# Patient Record
Sex: MALE | Race: WHITE | Employment: UNEMPLOYED | URBAN - NONMETROPOLITAN AREA
[De-identification: names, ages, dates, MRNs, and addresses within clinical notes are randomized per-mention and may not be internally consistent; named-entity substitution may affect disease eponyms.]

---

## 2019-09-24 ENCOUNTER — HOSPITAL ENCOUNTER (INPATIENT)
Age: 29
LOS: 3 days | Discharge: HOME | DRG: 751 | End: 2019-09-27
Attending: PSYCHIATRY & NEUROLOGY | Admitting: PSYCHIATRY & NEUROLOGY
Payer: COMMERCIAL

## 2019-09-24 DIAGNOSIS — R45.851 PASSIVE SUICIDAL IDEATIONS: ICD-10-CM

## 2019-09-24 DIAGNOSIS — F32.2 CURRENT SEVERE EPISODE OF MAJOR DEPRESSIVE DISORDER WITHOUT PSYCHOTIC FEATURES, UNSPECIFIED WHETHER RECURRENT (HCC): Primary | ICD-10-CM

## 2019-09-24 DIAGNOSIS — F19.10 SUBSTANCE ABUSE (HCC): ICD-10-CM

## 2019-09-24 PROBLEM — F33.1 MODERATE EPISODE OF RECURRENT MAJOR DEPRESSIVE DISORDER (HCC): Status: ACTIVE | Noted: 2019-09-24

## 2019-09-24 LAB
ACETAMINOPHEN LEVEL: <15 UG/ML
ALBUMIN SERPL-MCNC: 4.9 G/DL (ref 3.5–5.2)
ALP BLD-CCNC: 92 U/L (ref 40–130)
ALT SERPL-CCNC: 104 U/L (ref 5–41)
AMPHETAMINE SCREEN, URINE: POSITIVE
ANION GAP SERPL CALCULATED.3IONS-SCNC: 12 MMOL/L (ref 7–19)
AST SERPL-CCNC: 64 U/L (ref 5–40)
BARBITURATE SCREEN URINE: NEGATIVE
BASOPHILS ABSOLUTE: 0 K/UL (ref 0–0.2)
BASOPHILS RELATIVE PERCENT: 0.4 % (ref 0–1)
BENZODIAZEPINE SCREEN, URINE: POSITIVE
BILIRUB SERPL-MCNC: 0.5 MG/DL (ref 0.2–1.2)
BUN BLDV-MCNC: 14 MG/DL (ref 6–20)
CALCIUM SERPL-MCNC: 9.9 MG/DL (ref 8.6–10)
CANNABINOID SCREEN URINE: NEGATIVE
CHLORIDE BLD-SCNC: 104 MMOL/L (ref 98–111)
CO2: 23 MMOL/L (ref 22–29)
COCAINE METABOLITE SCREEN URINE: NEGATIVE
CREAT SERPL-MCNC: 0.9 MG/DL (ref 0.5–1.2)
EOSINOPHILS ABSOLUTE: 0.1 K/UL (ref 0–0.6)
EOSINOPHILS RELATIVE PERCENT: 1.1 % (ref 0–5)
ETHANOL: <10 MG/DL (ref 0–0.08)
GFR NON-AFRICAN AMERICAN: >60
GLUCOSE BLD-MCNC: 157 MG/DL (ref 74–109)
HCT VFR BLD CALC: 51.3 % (ref 42–52)
HEMOGLOBIN: 16.7 G/DL (ref 14–18)
IMMATURE GRANULOCYTES #: 0 K/UL
INR BLD: 1.13 (ref 0.88–1.18)
LYMPHOCYTES ABSOLUTE: 1.8 K/UL (ref 1.1–4.5)
LYMPHOCYTES RELATIVE PERCENT: 24 % (ref 20–40)
Lab: ABNORMAL
MCH RBC QN AUTO: 27.6 PG (ref 27–31)
MCHC RBC AUTO-ENTMCNC: 32.6 G/DL (ref 33–37)
MCV RBC AUTO: 84.9 FL (ref 80–94)
MONOCYTES ABSOLUTE: 0.3 K/UL (ref 0–0.9)
MONOCYTES RELATIVE PERCENT: 4 % (ref 0–10)
NEUTROPHILS ABSOLUTE: 5.3 K/UL (ref 1.5–7.5)
NEUTROPHILS RELATIVE PERCENT: 70.2 % (ref 50–65)
OPIATE SCREEN URINE: NEGATIVE
PDW BLD-RTO: 12.5 % (ref 11.5–14.5)
PLATELET # BLD: 275 K/UL (ref 130–400)
PMV BLD AUTO: 10.6 FL (ref 9.4–12.4)
POTASSIUM SERPL-SCNC: 3.8 MMOL/L (ref 3.5–5)
PROTHROMBIN TIME: 13.9 SEC (ref 12–14.6)
RBC # BLD: 6.04 M/UL (ref 4.7–6.1)
SALICYLATE, SERUM: <3 MG/DL (ref 3–10)
SODIUM BLD-SCNC: 139 MMOL/L (ref 136–145)
TOTAL PROTEIN: 8.1 G/DL (ref 6.6–8.7)
WBC # BLD: 7.5 K/UL (ref 4.8–10.8)

## 2019-09-24 PROCEDURE — 36415 COLL VENOUS BLD VENIPUNCTURE: CPT

## 2019-09-24 PROCEDURE — 1240000000 HC EMOTIONAL WELLNESS R&B

## 2019-09-24 PROCEDURE — 85025 COMPLETE CBC W/AUTO DIFF WBC: CPT

## 2019-09-24 PROCEDURE — 85610 PROTHROMBIN TIME: CPT

## 2019-09-24 PROCEDURE — 80307 DRUG TEST PRSMV CHEM ANLYZR: CPT

## 2019-09-24 PROCEDURE — 80053 COMPREHEN METABOLIC PANEL: CPT

## 2019-09-24 PROCEDURE — G0480 DRUG TEST DEF 1-7 CLASSES: HCPCS

## 2019-09-24 PROCEDURE — 99285 EMERGENCY DEPT VISIT HI MDM: CPT

## 2019-09-24 PROCEDURE — 90792 PSYCH DIAG EVAL W/MED SRVCS: CPT | Performed by: NURSE PRACTITIONER

## 2019-09-24 PROCEDURE — 6370000000 HC RX 637 (ALT 250 FOR IP): Performed by: NURSE PRACTITIONER

## 2019-09-24 RX ORDER — LANOLIN ALCOHOL/MO/W.PET/CERES
3 CREAM (GRAM) TOPICAL NIGHTLY PRN
Status: DISCONTINUED | OUTPATIENT
Start: 2019-09-24 | End: 2019-09-27 | Stop reason: HOSPADM

## 2019-09-24 RX ORDER — ESCITALOPRAM OXALATE 10 MG/1
10 TABLET ORAL DAILY
Status: DISCONTINUED | OUTPATIENT
Start: 2019-09-24 | End: 2019-09-24

## 2019-09-24 RX ORDER — BUPROPION HYDROCHLORIDE 150 MG/1
150 TABLET ORAL DAILY
Status: DISCONTINUED | OUTPATIENT
Start: 2019-09-25 | End: 2019-09-27 | Stop reason: HOSPADM

## 2019-09-24 RX ORDER — NICOTINE 21 MG/24HR
1 PATCH, TRANSDERMAL 24 HOURS TRANSDERMAL DAILY
Status: DISCONTINUED | OUTPATIENT
Start: 2019-09-24 | End: 2019-09-27 | Stop reason: HOSPADM

## 2019-09-24 RX ADMIN — ESCITALOPRAM OXALATE 10 MG: 10 TABLET ORAL at 13:19

## 2019-09-24 ASSESSMENT — ENCOUNTER SYMPTOMS: RESPIRATORY NEGATIVE: 1

## 2019-09-24 ASSESSMENT — PATIENT HEALTH QUESTIONNAIRE - PHQ9: SUM OF ALL RESPONSES TO PHQ QUESTIONS 1-9: 25

## 2019-09-24 ASSESSMENT — PAIN SCALES - GENERAL: PAINLEVEL_OUTOF10: 0

## 2019-09-24 ASSESSMENT — SLEEP AND FATIGUE QUESTIONNAIRES
AVERAGE NUMBER OF SLEEP HOURS: 15
DO YOU HAVE DIFFICULTY SLEEPING: NO
DO YOU USE A SLEEP AID: NO

## 2019-09-24 ASSESSMENT — LIFESTYLE VARIABLES: HISTORY_ALCOHOL_USE: NO

## 2019-09-25 LAB
CHOLESTEROL, TOTAL: 193 MG/DL (ref 160–199)
HBA1C MFR BLD: 5.5 % (ref 4–6)
HDLC SERPL-MCNC: 34 MG/DL (ref 55–121)
LDL CHOLESTEROL CALCULATED: 130 MG/DL
TRIGL SERPL-MCNC: 145 MG/DL (ref 0–149)
TSH REFLEX FT4: 2.31 UIU/ML (ref 0.35–5.5)
VITAMIN B-12: 1203 PG/ML (ref 211–946)
VITAMIN D 25-HYDROXY: 33.8 NG/ML

## 2019-09-25 PROCEDURE — 83036 HEMOGLOBIN GLYCOSYLATED A1C: CPT

## 2019-09-25 PROCEDURE — 84443 ASSAY THYROID STIM HORMONE: CPT

## 2019-09-25 PROCEDURE — 82607 VITAMIN B-12: CPT

## 2019-09-25 PROCEDURE — 80061 LIPID PANEL: CPT

## 2019-09-25 PROCEDURE — 6370000000 HC RX 637 (ALT 250 FOR IP): Performed by: NURSE PRACTITIONER

## 2019-09-25 PROCEDURE — 82306 VITAMIN D 25 HYDROXY: CPT

## 2019-09-25 PROCEDURE — 1240000000 HC EMOTIONAL WELLNESS R&B

## 2019-09-25 PROCEDURE — 36415 COLL VENOUS BLD VENIPUNCTURE: CPT

## 2019-09-25 PROCEDURE — 99231 SBSQ HOSP IP/OBS SF/LOW 25: CPT | Performed by: NURSE PRACTITIONER

## 2019-09-25 RX ADMIN — MELATONIN 3 MG: at 20:37

## 2019-09-25 RX ADMIN — BUPROPION HYDROCHLORIDE 150 MG: 150 TABLET, FILM COATED, EXTENDED RELEASE ORAL at 07:54

## 2019-09-26 PROCEDURE — 1240000000 HC EMOTIONAL WELLNESS R&B

## 2019-09-26 PROCEDURE — 99231 SBSQ HOSP IP/OBS SF/LOW 25: CPT | Performed by: NURSE PRACTITIONER

## 2019-09-26 PROCEDURE — 6370000000 HC RX 637 (ALT 250 FOR IP): Performed by: NURSE PRACTITIONER

## 2019-09-26 RX ADMIN — BUPROPION HYDROCHLORIDE 150 MG: 150 TABLET, FILM COATED, EXTENDED RELEASE ORAL at 08:14

## 2019-09-26 RX ADMIN — MELATONIN 3 MG: at 21:21

## 2019-09-26 ASSESSMENT — PAIN SCALES - GENERAL
PAINLEVEL_OUTOF10: 0
PAINLEVEL_OUTOF10: 0

## 2019-09-27 VITALS
HEIGHT: 70 IN | TEMPERATURE: 98.1 F | RESPIRATION RATE: 20 BRPM | DIASTOLIC BLOOD PRESSURE: 81 MMHG | WEIGHT: 260.8 LBS | SYSTOLIC BLOOD PRESSURE: 137 MMHG | OXYGEN SATURATION: 97 % | BODY MASS INDEX: 37.34 KG/M2 | HEART RATE: 82 BPM

## 2019-09-27 PROBLEM — F33.1 MAJOR DEPRESSIVE DISORDER, RECURRENT, MODERATE (HCC): Status: ACTIVE | Noted: 2019-09-27

## 2019-09-27 PROCEDURE — 6370000000 HC RX 637 (ALT 250 FOR IP): Performed by: NURSE PRACTITIONER

## 2019-09-27 PROCEDURE — 99238 HOSP IP/OBS DSCHRG MGMT 30/<: CPT | Performed by: NURSE PRACTITIONER

## 2019-09-27 PROCEDURE — 5130000000 HC BRIDGE APPOINTMENT

## 2019-09-27 RX ORDER — BUPROPION HYDROCHLORIDE 150 MG/1
150 TABLET ORAL DAILY
Qty: 30 TABLET | Refills: 0 | Status: SHIPPED | OUTPATIENT
Start: 2019-09-28

## 2019-09-27 RX ORDER — LANOLIN ALCOHOL/MO/W.PET/CERES
6 CREAM (GRAM) TOPICAL NIGHTLY PRN
Qty: 60 TABLET | Refills: 0 | Status: SHIPPED | OUTPATIENT
Start: 2019-09-27

## 2019-09-27 RX ADMIN — BUPROPION HYDROCHLORIDE 150 MG: 150 TABLET, FILM COATED, EXTENDED RELEASE ORAL at 08:57

## 2021-01-04 ENCOUNTER — APPOINTMENT (OUTPATIENT)
Dept: GENERAL RADIOLOGY | Facility: HOSPITAL | Age: 31
End: 2021-01-04

## 2021-01-04 ENCOUNTER — HOSPITAL ENCOUNTER (EMERGENCY)
Facility: HOSPITAL | Age: 31
Discharge: HOME | End: 2021-01-04
Admitting: INTERNAL MEDICINE

## 2021-01-04 VITALS
HEIGHT: 69 IN | HEART RATE: 69 BPM | DIASTOLIC BLOOD PRESSURE: 90 MMHG | TEMPERATURE: 97.4 F | RESPIRATION RATE: 15 BRPM | WEIGHT: 274 LBS | BODY MASS INDEX: 40.58 KG/M2 | OXYGEN SATURATION: 97 % | SYSTOLIC BLOOD PRESSURE: 152 MMHG

## 2021-01-04 DIAGNOSIS — R07.89 RIGHT-SIDED CHEST WALL PAIN: Primary | ICD-10-CM

## 2021-01-04 PROCEDURE — 71101 X-RAY EXAM UNILAT RIBS/CHEST: CPT

## 2021-01-04 PROCEDURE — 99282 EMERGENCY DEPT VISIT SF MDM: CPT

## 2021-01-04 RX ORDER — NAPROXEN 500 MG/1
500 TABLET ORAL 2 TIMES DAILY WITH MEALS
Qty: 20 TABLET | Refills: 0 | Status: SHIPPED | OUTPATIENT
Start: 2021-01-04 | End: 2021-01-14

## 2021-01-04 NOTE — DISCHARGE INSTRUCTIONS
Drink plenty of fluid.  Medication as ordered.  Can add tylenol as needed.  Follow up with PCP - call for appointment. Return to ED if condition does not improve or worsens

## 2021-01-04 NOTE — ED PROVIDER NOTES
Subjective   30 yom presents with c/o right sided rib pain.  He states he noticed the pain at 1600 yesterday.  The pain is located in the lower anterior rib area. The pain is worse with a deep breath.  There is no tenderness to the chest wall.  There is no swelling or deformity to the chest wall.  He denies injury.  He denies shortness of breath or chest pain.  He denies fever or cough.  He states he has been watching his daughter more and they've been playing and wrestling.          Review of Systems   Constitutional: Negative for activity change, appetite change, fatigue and fever.   HENT: Negative for congestion, ear pain, facial swelling and sore throat.    Eyes: Negative for discharge and visual disturbance.   Respiratory: Negative for apnea, chest tightness, shortness of breath, wheezing and stridor.    Cardiovascular: Negative for chest pain and palpitations.   Gastrointestinal: Negative for abdominal distention, abdominal pain, diarrhea, nausea and vomiting.   Genitourinary: Negative for difficulty urinating and dysuria.   Musculoskeletal: Negative for arthralgias and myalgias.   Skin: Negative for rash and wound.   Neurological: Negative for dizziness and seizures.   Psychiatric/Behavioral: Negative for agitation and confusion.       History reviewed. No pertinent past medical history.    No Known Allergies    Past Surgical History:   Procedure Laterality Date   • APPENDECTOMY N/A 9/19/2016    Procedure: APPENDECTOMY LAPAROSCOPIC;  Surgeon: Lauren Elizalde MD;  Location: [facility];  Service:        History reviewed. No pertinent family history.    Social History     Socioeconomic History   • Marital status: Single     Spouse name: Not on file   • Number of children: Not on file   • Years of education: Not on file   • Highest education level: Not on file   Tobacco Use   • Smoking status: Former Smoker   • Smokeless tobacco: Current User     Types: Chew   Substance and Sexual Activity   • Alcohol use: No   •  "Drug use: Defer   • Sexual activity: Defer           Objective   Physical Exam  Vitals signs and nursing note reviewed.   Constitutional:       Appearance: He is well-developed.   HENT:      Head: Normocephalic.   Eyes:      Pupils: Pupils are equal, round, and reactive to light.   Neck:      Musculoskeletal: Normal range of motion and neck supple.   Cardiovascular:      Rate and Rhythm: Normal rate and regular rhythm.      Heart sounds: No murmur.   Pulmonary:      Effort: Pulmonary effort is normal.      Breath sounds: Normal breath sounds.   Chest:      Chest wall: No mass, lacerations, deformity, swelling, tenderness, crepitus or edema.   Abdominal:      General: Bowel sounds are normal.      Palpations: Abdomen is soft.   Musculoskeletal: Normal range of motion.   Skin:     General: Skin is warm and dry.   Neurological:      Mental Status: He is alert and oriented to person, place, and time.         Procedures          No current facility-administered medications for this encounter.     Current Outpatient Medications:   •  naproxen (NAPROSYN) 500 MG tablet, Take 1 tablet by mouth 2 (Two) Times a Day With Meals for 10 days., Disp: 20 tablet, Rfl: 0    Vital signs:  /90   Pulse 69   Temp 97.4 °F (36.3 °C)   Resp 15   Ht 175.3 cm (69\")   Wt 124 kg (274 lb)   SpO2 97%   BMI 40.46 kg/m²        ED LAB RESULTS:   Lab Results (last 24 hours)     ** No results found for the last 24 hours. **             IMAGING RESULTS  XR Ribs Right With PA Chest   ED Interpretation   See results below      Final Result   1. Pulmonary hypoventilation with no consolidating infiltrates.   2. No right-sided rib pathology is detected.   This report was finalized on 01/04/2021 09:43 by Dr. Vick Levi MD.                     ED Course  ED Course as of Jan 05 0957   Mon Jan 04, 2021   1035 I discussed testing results with the patient.  He voiced understanding of d'c instructions and testing results.    [KS]      ED Course " User Index  [KS] Gregorio Hall APRN                                           MDM  Number of Diagnoses or Management Options  Right-sided chest wall pain: minor     Amount and/or Complexity of Data Reviewed  Tests in the radiology section of CPT®: ordered and reviewed  Independent visualization of images, tracings, or specimens: yes    Risk of Complications, Morbidity, and/or Mortality  Presenting problems: minimal  Diagnostic procedures: minimal  Management options: minimal    Patient Progress  Patient progress: stable      Final diagnoses:   Right-sided chest wall pain            Gregorio Hall APRN  01/05/21 0957

## 2024-04-05 ENCOUNTER — HOSPITAL ENCOUNTER (EMERGENCY)
Age: 34
Discharge: HOME | End: 2024-04-05
Payer: COMMERCIAL

## 2024-04-05 ENCOUNTER — APPOINTMENT (OUTPATIENT)
Dept: GENERAL RADIOLOGY | Age: 34
End: 2024-04-05
Payer: COMMERCIAL

## 2024-04-05 VITALS
OXYGEN SATURATION: 100 % | TEMPERATURE: 98.2 F | DIASTOLIC BLOOD PRESSURE: 97 MMHG | HEART RATE: 76 BPM | RESPIRATION RATE: 18 BRPM | SYSTOLIC BLOOD PRESSURE: 154 MMHG

## 2024-04-05 DIAGNOSIS — F41.9 ANXIETY: ICD-10-CM

## 2024-04-05 DIAGNOSIS — R07.9 CHEST PAIN, UNSPECIFIED TYPE: Primary | ICD-10-CM

## 2024-04-05 LAB
ALBUMIN SERPL-MCNC: 5 G/DL (ref 3.5–5.2)
ALP SERPL-CCNC: 90 U/L (ref 40–130)
ALT SERPL-CCNC: 18 U/L (ref 5–41)
ANION GAP SERPL CALCULATED.3IONS-SCNC: 13 MMOL/L (ref 7–19)
AST SERPL-CCNC: 16 U/L (ref 5–40)
BASOPHILS # BLD: 0 K/UL (ref 0–0.2)
BASOPHILS NFR BLD: 0.3 % (ref 0–1)
BILIRUB SERPL-MCNC: 0.4 MG/DL (ref 0.2–1.2)
BUN SERPL-MCNC: 13 MG/DL (ref 6–20)
CALCIUM SERPL-MCNC: 9.7 MG/DL (ref 8.6–10)
CHLORIDE SERPL-SCNC: 102 MMOL/L (ref 98–111)
CO2 SERPL-SCNC: 24 MMOL/L (ref 22–29)
CREAT SERPL-MCNC: 0.8 MG/DL (ref 0.5–1.2)
EOSINOPHIL # BLD: 0 K/UL (ref 0–0.6)
EOSINOPHIL NFR BLD: 0.6 % (ref 0–5)
ERYTHROCYTE [DISTWIDTH] IN BLOOD BY AUTOMATED COUNT: 12 % (ref 11.5–14.5)
GLUCOSE SERPL-MCNC: 108 MG/DL (ref 74–109)
HCT VFR BLD AUTO: 48.1 % (ref 42–52)
HGB BLD-MCNC: 15.7 G/DL (ref 14–18)
IMM GRANULOCYTES # BLD: 0 K/UL
LYMPHOCYTES # BLD: 1.4 K/UL (ref 1.1–4.5)
LYMPHOCYTES NFR BLD: 22.2 % (ref 20–40)
MCH RBC QN AUTO: 28 PG (ref 27–31)
MCHC RBC AUTO-ENTMCNC: 32.6 G/DL (ref 33–37)
MCV RBC AUTO: 85.7 FL (ref 80–94)
MONOCYTES # BLD: 0.4 K/UL (ref 0–0.9)
MONOCYTES NFR BLD: 6.5 % (ref 0–10)
NEUTROPHILS # BLD: 4.6 K/UL (ref 1.5–7.5)
NEUTS SEG NFR BLD: 70.2 % (ref 50–65)
PLATELET # BLD AUTO: 326 K/UL (ref 130–400)
PMV BLD AUTO: 9.8 FL (ref 9.4–12.4)
POTASSIUM SERPL-SCNC: 4.1 MMOL/L (ref 3.5–5)
PROT SERPL-MCNC: 7.9 G/DL (ref 6.6–8.7)
RBC # BLD AUTO: 5.61 M/UL (ref 4.7–6.1)
SODIUM SERPL-SCNC: 139 MMOL/L (ref 136–145)
TROPONIN, HIGH SENSITIVITY: <6 NG/L (ref 0–22)
WBC # BLD AUTO: 6.5 K/UL (ref 4.8–10.8)

## 2024-04-05 PROCEDURE — 99285 EMERGENCY DEPT VISIT HI MDM: CPT

## 2024-04-05 PROCEDURE — 71045 X-RAY EXAM CHEST 1 VIEW: CPT

## 2024-04-05 PROCEDURE — 93005 ELECTROCARDIOGRAM TRACING: CPT | Performed by: NURSE PRACTITIONER

## 2024-04-05 PROCEDURE — 36415 COLL VENOUS BLD VENIPUNCTURE: CPT

## 2024-04-05 PROCEDURE — 85025 COMPLETE CBC W/AUTO DIFF WBC: CPT

## 2024-04-05 PROCEDURE — 84484 ASSAY OF TROPONIN QUANT: CPT

## 2024-04-05 PROCEDURE — 80053 COMPREHEN METABOLIC PANEL: CPT

## 2024-04-05 RX ORDER — HYDROXYZINE HYDROCHLORIDE 25 MG/1
25 TABLET, FILM COATED ORAL EVERY 8 HOURS PRN
Qty: 30 TABLET | Refills: 0 | Status: SHIPPED | OUTPATIENT
Start: 2024-04-05 | End: 2024-04-15

## 2024-04-05 ASSESSMENT — PAIN - FUNCTIONAL ASSESSMENT: PAIN_FUNCTIONAL_ASSESSMENT: NONE - DENIES PAIN

## 2024-04-05 ASSESSMENT — ENCOUNTER SYMPTOMS
BACK PAIN: 0
COUGH: 0
NAUSEA: 0
ABDOMINAL PAIN: 0
SHORTNESS OF BREATH: 0
DIARRHEA: 0
CHEST TIGHTNESS: 0

## 2024-04-05 ASSESSMENT — LIFESTYLE VARIABLES
HOW OFTEN DO YOU HAVE A DRINK CONTAINING ALCOHOL: NEVER
HOW MANY STANDARD DRINKS CONTAINING ALCOHOL DO YOU HAVE ON A TYPICAL DAY: PATIENT DOES NOT DRINK

## 2024-04-05 ASSESSMENT — HEART SCORE: ECG: NON-SPECIFC REPOLARIZATION DISTURBANCE/LBTB/PM

## 2024-04-05 NOTE — ED PROVIDER NOTES
[facility] EMERGENCY DEPT  EMERGENCY DEPARTMENT ENCOUNTER      Pt Name: Daljit Montero  MRN: [id number]  Birthdate 1990  Date of evaluation: 2024  Provider: RUDY Black CNP  11:03 AM    CHIEF COMPLAINT       Chief Complaint   Patient presents with    Anxiety     Pt presents to ED with c/o anxiety and hypertension after the passing of his dad last week denies SI denies HI. Pt calm and cooperative at this time. Pt states that he has drank kratom every day for the past year.      Hypertension         HISTORY OF PRESENT ILLNESS    Daljit Montero is a 33 y.o. male who presents to the emergency department with concern for anxiety and not feeling well since his dad  on . Denies SI/HI. Accompanied by mom and sister. Reports intermittent feeling of chest pain, rapid breathing, sweaty and anxious. Reports high blood pressure currently but no previous dx or script. No chest pain or shortness of breath currently. Feels jittery. No leg pain or swelling. PERC negative.     HPI    Nursing Notes were reviewed.    REVIEW OF SYSTEMS       Review of Systems   Constitutional:  Negative for chills and fever.   HENT:  Negative for congestion.    Respiratory:  Negative for cough, chest tightness and shortness of breath.    Cardiovascular:  Negative for chest pain, palpitations and leg swelling.   Gastrointestinal:  Negative for abdominal pain, diarrhea, nausea and vomiting.   Genitourinary:  Negative for dysuria, flank pain, frequency and urgency.   Musculoskeletal:  Negative for back pain and neck pain.   Neurological:  Negative for dizziness, syncope, weakness, light-headedness and headaches.   Psychiatric/Behavioral:  Negative for hallucinations, self-injury and suicidal ideas. The patient is nervous/anxious.        Except as noted above the remainder of the review of systems was reviewed and negative.       PAST MEDICAL HISTORY   No past medical history on file.      SURGICAL HISTORY       Past  mis-transcribed.)    RUDY Black CNP (electronically signed)  Attending Emergency Physician           Liz Freitas APRN - CNP  04/05/24 1103

## 2024-04-08 LAB
EKG P AXIS: 34 DEGREES
EKG P-R INTERVAL: 190 MS
EKG Q-T INTERVAL: 372 MS
EKG QRS DURATION: 110 MS
EKG QTC CALCULATION (BAZETT): 396 MS
EKG T AXIS: 44 DEGREES

## 2024-04-08 PROCEDURE — 93010 ELECTROCARDIOGRAM REPORT: CPT | Performed by: INTERNAL MEDICINE

## 2024-04-09 ENCOUNTER — TELEPHONE (OUTPATIENT)
Dept: PSYCHIATRY | Age: 34
End: 2024-04-09

## 2024-04-09 NOTE — TELEPHONE ENCOUNTER
Called pt to schedule an appt from a referral.    No answer and LVM.    Electronically signed by Elena Guzman MA on 4/9/2024 at 3:52 PM

## 2024-04-09 NOTE — TELEPHONE ENCOUNTER
Called pt to schedule an appt from a referral on 04/08/24    Offered an appt for 04/09/24 at 1:00 with Dr. Johnson and pt stated that he would give our office a call back to let us know if he would be able to come to that appt or not.    Electronically signed by Elena Guzman MA on 4/9/2024 at 3:51 PM

## 2024-04-10 ENCOUNTER — HOSPITAL ENCOUNTER (EMERGENCY)
Facility: HOSPITAL | Age: 34
Discharge: HOME | End: 2024-04-10
Payer: COMMERCIAL

## 2024-04-10 ENCOUNTER — APPOINTMENT (OUTPATIENT)
Dept: GENERAL RADIOLOGY | Facility: HOSPITAL | Age: 34
End: 2024-04-10
Payer: COMMERCIAL

## 2024-04-10 ENCOUNTER — APPOINTMENT (OUTPATIENT)
Dept: CT IMAGING | Facility: HOSPITAL | Age: 34
End: 2024-04-10
Payer: COMMERCIAL

## 2024-04-10 VITALS
HEART RATE: 64 BPM | WEIGHT: 228 LBS | SYSTOLIC BLOOD PRESSURE: 130 MMHG | TEMPERATURE: 98 F | DIASTOLIC BLOOD PRESSURE: 95 MMHG | BODY MASS INDEX: 34.56 KG/M2 | HEIGHT: 68 IN | RESPIRATION RATE: 16 BRPM | OXYGEN SATURATION: 97 %

## 2024-04-10 DIAGNOSIS — F41.9 ANXIETY: Primary | ICD-10-CM

## 2024-04-10 DIAGNOSIS — R11.0 NAUSEA: ICD-10-CM

## 2024-04-10 DIAGNOSIS — R10.9 ABDOMINAL PAIN, UNSPECIFIED ABDOMINAL LOCATION: ICD-10-CM

## 2024-04-10 DIAGNOSIS — F43.21 GRIEF REACTION: ICD-10-CM

## 2024-04-10 LAB
ALBUMIN SERPL-MCNC: 5.1 G/DL (ref 3.5–5.2)
ALBUMIN/GLOB SERPL: 1.7 G/DL
ALP SERPL-CCNC: 89 U/L (ref 39–117)
ALT SERPL W P-5'-P-CCNC: 14 U/L (ref 1–41)
AMPHET+METHAMPHET UR QL: NEGATIVE
AMPHETAMINES UR QL: NEGATIVE
ANION GAP SERPL CALCULATED.3IONS-SCNC: 13 MMOL/L (ref 5–15)
APTT PPP: 31 SECONDS (ref 24.5–36)
AST SERPL-CCNC: 15 U/L (ref 1–40)
B PARAPERT DNA SPEC QL NAA+PROBE: NOT DETECTED
B PERT DNA SPEC QL NAA+PROBE: NOT DETECTED
BARBITURATES UR QL SCN: NEGATIVE
BASOPHILS # BLD AUTO: 0.02 10*3/MM3 (ref 0–0.2)
BASOPHILS NFR BLD AUTO: 0.3 % (ref 0–1.5)
BENZODIAZ UR QL SCN: POSITIVE
BILIRUB SERPL-MCNC: 0.3 MG/DL (ref 0–1.2)
BUN SERPL-MCNC: 12 MG/DL (ref 6–20)
BUN/CREAT SERPL: 15.4 (ref 7–25)
BUPRENORPHINE SERPL-MCNC: NEGATIVE NG/ML
C PNEUM DNA NPH QL NAA+NON-PROBE: NOT DETECTED
CALCIUM SPEC-SCNC: 10.4 MG/DL (ref 8.6–10.5)
CANNABINOIDS SERPL QL: POSITIVE
CHLORIDE SERPL-SCNC: 103 MMOL/L (ref 98–107)
CO2 SERPL-SCNC: 23 MMOL/L (ref 22–29)
COCAINE UR QL: NEGATIVE
CREAT SERPL-MCNC: 0.78 MG/DL (ref 0.76–1.27)
DEPRECATED RDW RBC AUTO: 37 FL (ref 37–54)
EGFRCR SERPLBLD CKD-EPI 2021: 121.5 ML/MIN/1.73
EOSINOPHIL # BLD AUTO: 0.02 10*3/MM3 (ref 0–0.4)
EOSINOPHIL NFR BLD AUTO: 0.3 % (ref 0.3–6.2)
ERYTHROCYTE [DISTWIDTH] IN BLOOD BY AUTOMATED COUNT: 12.2 % (ref 12.3–15.4)
ETHANOL UR QL: <0.01 %
FENTANYL UR-MCNC: NEGATIVE NG/ML
FLUAV SUBTYP SPEC NAA+PROBE: NOT DETECTED
FLUBV RNA ISLT QL NAA+PROBE: NOT DETECTED
GEN 5 2HR TROPONIN T REFLEX: <6 NG/L
GLOBULIN UR ELPH-MCNC: 3 GM/DL
GLUCOSE SERPL-MCNC: 111 MG/DL (ref 65–99)
HADV DNA SPEC NAA+PROBE: NOT DETECTED
HCOV 229E RNA SPEC QL NAA+PROBE: NOT DETECTED
HCOV HKU1 RNA SPEC QL NAA+PROBE: NOT DETECTED
HCOV NL63 RNA SPEC QL NAA+PROBE: NOT DETECTED
HCOV OC43 RNA SPEC QL NAA+PROBE: NOT DETECTED
HCT VFR BLD AUTO: 47.5 % (ref 37.5–51)
HGB BLD-MCNC: 15.7 G/DL (ref 13–17.7)
HMPV RNA NPH QL NAA+NON-PROBE: NOT DETECTED
HPIV1 RNA ISLT QL NAA+PROBE: NOT DETECTED
HPIV2 RNA SPEC QL NAA+PROBE: NOT DETECTED
HPIV3 RNA NPH QL NAA+PROBE: NOT DETECTED
HPIV4 P GENE NPH QL NAA+PROBE: NOT DETECTED
IMM GRANULOCYTES # BLD AUTO: 0.02 10*3/MM3 (ref 0–0.05)
IMM GRANULOCYTES NFR BLD AUTO: 0.3 % (ref 0–0.5)
INR PPP: 1.03 (ref 0.91–1.09)
LIPASE SERPL-CCNC: 32 U/L (ref 13–60)
LYMPHOCYTES # BLD AUTO: 1.09 10*3/MM3 (ref 0.7–3.1)
LYMPHOCYTES NFR BLD AUTO: 17.2 % (ref 19.6–45.3)
M PNEUMO IGG SER IA-ACNC: NOT DETECTED
MAGNESIUM SERPL-MCNC: 2.1 MG/DL (ref 1.6–2.6)
MCH RBC QN AUTO: 27.5 PG (ref 26.6–33)
MCHC RBC AUTO-ENTMCNC: 33.1 G/DL (ref 31.5–35.7)
MCV RBC AUTO: 83.3 FL (ref 79–97)
METHADONE UR QL SCN: NEGATIVE
MONOCYTES # BLD AUTO: 0.33 10*3/MM3 (ref 0.1–0.9)
MONOCYTES NFR BLD AUTO: 5.2 % (ref 5–12)
NEUTROPHILS NFR BLD AUTO: 4.84 10*3/MM3 (ref 1.7–7)
NEUTROPHILS NFR BLD AUTO: 76.7 % (ref 42.7–76)
NRBC BLD AUTO-RTO: 0 /100 WBC (ref 0–0.2)
OPIATES UR QL: NEGATIVE
OXYCODONE UR QL SCN: NEGATIVE
PCP UR QL SCN: NEGATIVE
PLATELET # BLD AUTO: 332 10*3/MM3 (ref 140–450)
PMV BLD AUTO: 9.5 FL (ref 6–12)
POTASSIUM SERPL-SCNC: 4 MMOL/L (ref 3.5–5.2)
PROT SERPL-MCNC: 8.1 G/DL (ref 6–8.5)
PROTHROMBIN TIME: 13.9 SECONDS (ref 11.8–14.8)
QT INTERVAL: 400 MS
QTC INTERVAL: 409 MS
RBC # BLD AUTO: 5.7 10*6/MM3 (ref 4.14–5.8)
RHINOVIRUS RNA SPEC NAA+PROBE: NOT DETECTED
RSV RNA NPH QL NAA+NON-PROBE: NOT DETECTED
SARS-COV-2 RNA NPH QL NAA+NON-PROBE: NOT DETECTED
SODIUM SERPL-SCNC: 139 MMOL/L (ref 136–145)
T4 FREE SERPL-MCNC: 1.5 NG/DL (ref 0.93–1.7)
TRICYCLICS UR QL SCN: NEGATIVE
TROPONIN T DELTA: NORMAL
TROPONIN T SERPL HS-MCNC: <6 NG/L
TSH SERPL DL<=0.05 MIU/L-ACNC: 0.62 UIU/ML (ref 0.27–4.2)
WBC NRBC COR # BLD AUTO: 6.32 10*3/MM3 (ref 3.4–10.8)

## 2024-04-10 PROCEDURE — 82077 ASSAY SPEC XCP UR&BREATH IA: CPT | Performed by: PHYSICIAN ASSISTANT

## 2024-04-10 PROCEDURE — 80307 DRUG TEST PRSMV CHEM ANLYZR: CPT | Performed by: PHYSICIAN ASSISTANT

## 2024-04-10 PROCEDURE — 96374 THER/PROPH/DIAG INJ IV PUSH: CPT

## 2024-04-10 PROCEDURE — 80053 COMPREHEN METABOLIC PANEL: CPT | Performed by: PHYSICIAN ASSISTANT

## 2024-04-10 PROCEDURE — 36415 COLL VENOUS BLD VENIPUNCTURE: CPT

## 2024-04-10 PROCEDURE — 25510000001 IOPAMIDOL PER 1 ML: Performed by: PHYSICIAN ASSISTANT

## 2024-04-10 PROCEDURE — 93005 ELECTROCARDIOGRAM TRACING: CPT | Performed by: PHYSICIAN ASSISTANT

## 2024-04-10 PROCEDURE — 83690 ASSAY OF LIPASE: CPT | Performed by: PHYSICIAN ASSISTANT

## 2024-04-10 PROCEDURE — 25810000003 SODIUM CHLORIDE 0.9 % SOLUTION: Performed by: PHYSICIAN ASSISTANT

## 2024-04-10 PROCEDURE — 71275 CT ANGIOGRAPHY CHEST: CPT

## 2024-04-10 PROCEDURE — 85025 COMPLETE CBC W/AUTO DIFF WBC: CPT | Performed by: PHYSICIAN ASSISTANT

## 2024-04-10 PROCEDURE — 85610 PROTHROMBIN TIME: CPT | Performed by: PHYSICIAN ASSISTANT

## 2024-04-10 PROCEDURE — 99285 EMERGENCY DEPT VISIT HI MDM: CPT

## 2024-04-10 PROCEDURE — 84443 ASSAY THYROID STIM HORMONE: CPT | Performed by: PHYSICIAN ASSISTANT

## 2024-04-10 PROCEDURE — 74177 CT ABD & PELVIS W/CONTRAST: CPT

## 2024-04-10 PROCEDURE — 84484 ASSAY OF TROPONIN QUANT: CPT | Performed by: PHYSICIAN ASSISTANT

## 2024-04-10 PROCEDURE — 0202U NFCT DS 22 TRGT SARS-COV-2: CPT | Performed by: PHYSICIAN ASSISTANT

## 2024-04-10 PROCEDURE — 84439 ASSAY OF FREE THYROXINE: CPT | Performed by: PHYSICIAN ASSISTANT

## 2024-04-10 PROCEDURE — 25010000002 ONDANSETRON PER 1 MG: Performed by: PHYSICIAN ASSISTANT

## 2024-04-10 PROCEDURE — 85730 THROMBOPLASTIN TIME PARTIAL: CPT | Performed by: PHYSICIAN ASSISTANT

## 2024-04-10 PROCEDURE — 83735 ASSAY OF MAGNESIUM: CPT | Performed by: PHYSICIAN ASSISTANT

## 2024-04-10 PROCEDURE — 71045 X-RAY EXAM CHEST 1 VIEW: CPT

## 2024-04-10 RX ORDER — CLARITHROMYCIN 500 MG/1
500 TABLET, COATED ORAL 2 TIMES DAILY
Qty: 28 TABLET | Refills: 0 | Status: SHIPPED | OUTPATIENT
Start: 2024-04-10 | End: 2024-04-24

## 2024-04-10 RX ORDER — ONDANSETRON 2 MG/ML
4 INJECTION INTRAMUSCULAR; INTRAVENOUS ONCE
Status: COMPLETED | OUTPATIENT
Start: 2024-04-10 | End: 2024-04-10

## 2024-04-10 RX ORDER — HYDROXYZINE HYDROCHLORIDE 25 MG/1
25 TABLET, FILM COATED ORAL EVERY 6 HOURS PRN
Qty: 12 TABLET | Refills: 0 | Status: SHIPPED | OUTPATIENT
Start: 2024-04-10

## 2024-04-10 RX ORDER — HYDROXYZINE HYDROCHLORIDE 25 MG/1
50 TABLET, FILM COATED ORAL ONCE
Status: COMPLETED | OUTPATIENT
Start: 2024-04-10 | End: 2024-04-10

## 2024-04-10 RX ORDER — ONDANSETRON 4 MG/1
4 TABLET, ORALLY DISINTEGRATING ORAL EVERY 6 HOURS PRN
Qty: 12 TABLET | Refills: 0 | Status: SHIPPED | OUTPATIENT
Start: 2024-04-10 | End: 2024-04-22 | Stop reason: SDUPTHER

## 2024-04-10 RX ORDER — SODIUM CHLORIDE 0.9 % (FLUSH) 0.9 %
10 SYRINGE (ML) INJECTION AS NEEDED
Status: DISCONTINUED | OUTPATIENT
Start: 2024-04-10 | End: 2024-04-10 | Stop reason: HOSPADM

## 2024-04-10 RX ORDER — SUCRALFATE ORAL 1 G/10ML
1 SUSPENSION ORAL 3 TIMES DAILY
Qty: 300 ML | Refills: 0 | Status: SHIPPED | OUTPATIENT
Start: 2024-04-10 | End: 2024-04-20

## 2024-04-10 RX ORDER — OMEPRAZOLE 40 MG/1
40 CAPSULE, DELAYED RELEASE ORAL DAILY
Qty: 30 CAPSULE | Refills: 0 | Status: SHIPPED | OUTPATIENT
Start: 2024-04-10 | End: 2024-05-10

## 2024-04-10 RX ADMIN — ONDANSETRON 4 MG: 2 INJECTION INTRAMUSCULAR; INTRAVENOUS at 09:52

## 2024-04-10 RX ADMIN — IOPAMIDOL 100 ML: 755 INJECTION, SOLUTION INTRAVENOUS at 10:08

## 2024-04-10 RX ADMIN — SODIUM CHLORIDE 1000 ML: 9 INJECTION, SOLUTION INTRAVENOUS at 09:52

## 2024-04-10 RX ADMIN — HYDROXYZINE HYDROCHLORIDE 50 MG: 25 TABLET ORAL at 09:51

## 2024-04-10 NOTE — DISCHARGE INSTRUCTIONS
As we discussed please see information below for local mental/behavioral health resources.  You may use the hydroxyzine for breakthrough anxiety until you are able to meet with a psychiatrist and discussed the need for further medication management.  Please try to minimize stress, caffeinated products, spicy food, greasy/fatty/fried foods and complete your course of treatment for concerns of a stress ulcer.  Please follow with your primary care provider within the next 48 hours for reevaluation however should you develop any new or worsening symptoms please return to the ER for further evaluation.

## 2024-04-10 NOTE — ED PROVIDER NOTES
"Subjective   History of Present Illness    Patient is a 33-year-old male presenting to ED with abdominal pain, nausea, vomiting, hypertension.  PMH significant for appendectomy, anxiety.  Patient states approximately 11 days ago his father passed away on Easter after complications of a \"fast cancer that eats you up.\"  Patient states that since that time he has been struggling with feelings of hypertension.  Patient states he has never been diagnosed with hypertension before however in his early 20s he did have to take medicine for short period of time during a period of substance abuse.  Patient states that he is feeling palpitations, numbness to both of his hands as well as his left foot, and describes that he is starting to have nausea which this morning turned into vomiting.  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  Patient states that to his best knowledge his father had no infectious diseases.  Patient states that since his father's passed away he did find some lorazepam he left behind and tried taking 1 mg associating this with anxiety however he did not feel any significant improvement in his symptoms at which time he presented to urgent care where they advised to come to the ER for further evaluation.  Patient denies localized abdominal tenderness but reports generalized discomfort.  Patient denies diarrhea, constipation, any urinary symptoms, fevers, chills, or diaphoresis.  Patient denies use of any other medications and presents at this time for further evaluation.    Patient states with his history of previous substance abuse he has never in his life used IV drugs.  Patient states he would \"just take pills or what ever I can get my hands on.\"  Patient reports he has been sober for " "approximately 10 years however he does still intermittently use marijuana vapes and does take 1 g of kratom daily for which he has been trying to wean himself off.  Patient intermittently uses tobacco dips in shoes but describes he has not even been using those recently.    Patient did add that he has a good support system however he is open to receiving resources to help with grief and coping as he states \"I am just starting to get really sad.\"    Records reviewed show patient was last seen in the ED on 1/4/2021 for right-sided chest wall pain.    Patient last seen the outpatient setting earlier today at urgent care for stomach pain, nausea, vomiting, diarrhea, anxiety, hypertension.  Patient had a workup performed and was advised to go to the ER for further evaluation.    Review of Systems   Constitutional: Negative.  Negative for chills, diaphoresis and fever.   HENT: Negative.     Eyes: Negative.    Respiratory:  Positive for chest tightness and shortness of breath.    Cardiovascular:  Positive for palpitations. Negative for chest pain.   Gastrointestinal:  Positive for abdominal pain (generalized), nausea and vomiting. Negative for constipation and diarrhea.   Genitourinary: Negative.  Negative for dysuria, flank pain and hematuria.   Musculoskeletal: Negative.  Negative for myalgias.   Skin: Negative.  Negative for rash.   Neurological:  Positive for light-headedness and numbness (bilateral hands, left foot). Negative for dizziness.   Psychiatric/Behavioral:  The patient is nervous/anxious.    All other systems reviewed and are negative.      Past Medical History:   Diagnosis Date    Anxiety        No Known Allergies    Past Surgical History:   Procedure Laterality Date    APPENDECTOMY N/A 9/19/2016    Procedure: APPENDECTOMY LAPAROSCOPIC;  Surgeon: Lauren Elizalde MD;  Location: [facility] OR;  Service:        History reviewed. No pertinent family history.    Social History     Socioeconomic History    Marital " status: Single   Tobacco Use    Smoking status: Former    Smokeless tobacco: Current     Types: Chew   Vaping Use    Vaping status: Every Day    Substances: THC    Devices: Pre-filled or refillable cartridge   Substance and Sexual Activity    Alcohol use: No    Drug use: Defer    Sexual activity: Defer           Objective   Physical Exam  Vitals and nursing note reviewed.   Constitutional:       General: He is not in acute distress.     Appearance: Normal appearance. He is not ill-appearing, toxic-appearing or diaphoretic.   HENT:      Head: Normocephalic.      Mouth/Throat:      Mouth: Mucous membranes are moist.      Pharynx: Oropharynx is clear.   Eyes:      Conjunctiva/sclera: Conjunctivae normal.      Pupils: Pupils are equal, round, and reactive to light.   Cardiovascular:      Rate and Rhythm: Normal rate and regular rhythm.      Heart sounds: No murmur heard.     Comments: No calf tenderness bilaterally  Pulmonary:      Effort: Pulmonary effort is normal. No respiratory distress.      Breath sounds: Normal breath sounds. No wheezing or rales.   Chest:      Chest wall: No tenderness.   Abdominal:      General: Bowel sounds are normal.      Palpations: Abdomen is soft.      Tenderness: There is no abdominal tenderness.   Musculoskeletal:         General: Normal range of motion.      Cervical back: Neck supple.      Right lower leg: No edema.      Left lower leg: No edema.   Skin:     General: Skin is warm and dry.      Coloration: Skin is not jaundiced.   Neurological:      General: No focal deficit present.      Mental Status: He is alert and oriented to person, place, and time.      Cranial Nerves: No cranial nerve deficit.      Sensory: No sensory deficit.      Motor: No weakness.      Coordination: Coordination normal.      Gait: Gait normal.   Psychiatric:         Attention and Perception: Attention normal.         Mood and Affect: Mood is anxious and depressed. Affect is tearful.         Speech: Speech  normal.         Behavior: Behavior normal. Behavior is cooperative.         Procedures           ED Course                                             Medical Decision Making  Problems Addressed:  Abdominal pain, unspecified abdominal location: complicated acute illness or injury  Anxiety: complicated acute illness or injury  Grief reaction: complicated acute illness or injury  Nausea: complicated acute illness or injury    Amount and/or Complexity of Data Reviewed  Independent Historian: parent     Details: Mother  External Data Reviewed: labs, radiology and notes.  Labs: ordered. Decision-making details documented in ED Course.  Radiology: ordered. Decision-making details documented in ED Course.  ECG/medicine tests: ordered. Decision-making details documented in ED Course.  Discussion of management or test interpretation with external provider(s): Dr. Doris Rocha (attending)    Risk  Prescription drug management.      Patient is a 33-year-old male presenting to ED with abdominal pain, nausea, vomiting, hypertension.  PMH significant for appendectomy, anxiety.  Upon initial evaluation patient is resting in bed tearful and anxious however otherwise in no acute distress, non-ill-appearing, nontoxic appearing.  Patient hypertensive with systolic pressures in the 150s and diastolic pressures in the 100s with otherwise stable vital signs.  Examination is reassuring with abdomen soft, nontender, nondistended with no localized focal tenderness, no CVAT.  No peripheral edema or calf tenderness.  Lungs are clear to auscultation bilaterally.  Patient with no focal neurological deficits including no sensation deficits.  Discussed with patient likely anxiety is exacerbating his symptoms however need for workup to include lab work, respiratory testing, chest x-ray, CT imaging for which she is amenable with no further questions, concerns, needs at this time.    Differential diagnosis: Anxiety, stress reaction, pulmonary  emboli, viral illness, gastroenteritis, substance use, electro disturbance, thyroid hormone abnormality, abnormal cardiac arrhythmia, abnormal cardiac enzymes, ACS, other    Work up revealed normal white blood cell count, stable H&H and no further CBC abnormalities.  CMP unremarkable to include no renal dysfunction, no hepatic dysfunction and no electrolyte disturbances including normal magnesium.  PT/INR WNL.  Low concern for pancreatic abnormalities to include pancreatitis with normal lipase.  Thyroid hormones unremarkable.  High-sensitivity troponins less than 6 when repeated twice with no acute EKG changes.  Respiratory panel unremarkable.  Alcohol negative.  UDS positive for THC which patient admitted to use, benzodiazepine from the use of his father's  benzos and otherwise unremarkable including negative fentanyl.  X-ray showed: No acute disease.  Chest CTA showed: No pulmonary emboli, no acute lung disease abdomen/pelvis CT showed: No acute findings in the abdomen or pelvis.  Patient received a fluid bolus secondary to contrast.  Patient was given a dose of Zofran as well as hydroxyzine for which he had resolution of his nausea and was able to tolerate p.o. fluids.  Patient had significant improvement in his anxiety and was able to rest comfortably while in ED.  Patient's initial hypertensive status resolved without any need for antihypertensive medications and his vital signs otherwise remained stable.  Discussed with patient concern for anxiety worsening his symptoms as well as possible development of gastric abnormalities to include stress ulcers.  Advised importance of following up with a GI provider need to follow-up with his primary care provider within the next 48 hours for close reevaluation and referral.  Discussed appropriate treatment including dietary modifications as well as acute treatment.  Discussed with patient ability to give a course of hydroxyzine in an attempt to avoid any addictive  substances.  Discussed significant importance of avoiding use of substances such as kratom.  Provided patient with multiple mental/behavioral health resources for which she is very appreciative.  Throughout evaluation patient was asked numerous times and continued to deny any suicidal or homicidal ideation.  Patient's mother did present at bedside and added additional history reporting that they live at home where it was formerly patient, his mother, his father, and his soon-to-be 12-year-old daughter who he has had sole custody of since she was 10 months old.  Mother describes that patient does not have very many friends or interactions and his father was his best friend for which he is feeling excessive sadness especially after caring for him over the past year.  Reiterated to patient local mental health resources and ability to follow-up with his primary care provider as well for additional assistance.  Patient is very appreciative.  Patient continues to be hemodynamically stable and was able to tolerate p.o. fluids without difficulty.  Patient with no further questions, concerns, needs at this time and is stable for discharge.      Final diagnoses:   Anxiety   Grief reaction   Nausea   Abdominal pain, unspecified abdominal location       ED Disposition  ED Disposition       ED Disposition   Discharge    Condition   Stable    Comment   --               Provider, No Known  [facility] [address]  [phone]    Schedule an appointment as soon as possible for a visit in 2 days      PATIENT CONNECTION - [facility] [address]  [phone]  Schedule an appointment as soon as possible for a visit in 2 days      [facility] [address] [place] [id number] [phone]  Call   As needed    [facility], [address] [place]  [facility] [address]  [phone]  Call   As needed    [place] [id number]  [facility]  [address]  [phone]  Call   As needed    [facility] EMERGENCY DEPARTMENT  [address]  [facility] [address] [phone]    As needed         Medication List        New Prescriptions      clarithromycin 500 MG tablet  Commonly known as: BIAXIN  Take 1 tablet by mouth 2 (Two) Times a Day for 14 days.     hydrOXYzine 25 MG tablet  Commonly known as: ATARAX  Take 1 tablet by mouth Every 6 (Six) Hours As Needed for Anxiety.     omeprazole 40 MG capsule  Commonly known as: priLOSEC  Take 1 capsule by mouth Daily for 30 days.     ondansetron ODT 4 MG disintegrating tablet  Commonly known as: ZOFRAN-ODT  Place 1 tablet on the tongue Every 6 (Six) Hours As Needed for Nausea.     sucralfate 1 GM/10ML suspension  Commonly known as: CARAFATE  Take 10 mL by mouth 3 (Three) Times a Day for 10 days.               Where to Get Your Medications        These medications were sent to [facility] - [place], [place] - [address] [place], [place] [id number]  - [phone]   [address] [place] [address]      Phone: [phone]   clarithromycin 500 MG tablet  hydrOXYzine 25 MG tablet  omeprazole 40 MG capsule  ondansetron ODT 4 MG disintegrating tablet  sucralfate 1 GM/10ML suspension            John Vela PA-C  04/10/24 9146

## 2024-04-10 NOTE — Clinical Note
[facility] EMERGENCY DEPARTMENT  [address]  [facility] [address]  Phone: [phone]    Kyle Buchanan was seen and treated in our emergency department on 4/10/2024.  He may return to work on 04/17/2024.         Thank you for choosing [facility].    John Vela PA-C

## 2024-04-11 ENCOUNTER — TELEPHONE (OUTPATIENT)
Dept: PSYCHIATRY | Age: 34
End: 2024-04-11

## 2024-04-11 NOTE — TELEPHONE ENCOUNTER
Pt called back to schedule an appt from a referral    Scheduled with Dr. Johnson for 04/17/24 @ 2:30.    Electronically signed by Elena Guzman MA on 4/11/2024 at 9:48 AM

## 2024-04-16 ENCOUNTER — TELEPHONE (OUTPATIENT)
Dept: PSYCHIATRY | Age: 34
End: 2024-04-16

## 2024-04-16 LAB
QT INTERVAL: 400 MS
QTC INTERVAL: 409 MS

## 2024-04-16 NOTE — TELEPHONE ENCOUNTER
Called and confirmed appt with pt for 4/17 @ 2:30 PM      Electronically signed by Fern Quijano on 4/16/2024 at 12:01 PM

## 2024-04-17 ENCOUNTER — TELEPHONE (OUTPATIENT)
Dept: PSYCHIATRY | Age: 34
End: 2024-04-17

## 2024-04-17 NOTE — TELEPHONE ENCOUNTER
Pt called to reschedule his appt for 4/17 @ 2:30 PM due to having to work today     Appt rescheduled for 4/24 @ 2:30 PM      Electronically signed by Fern Quijano on 4/17/2024 at 9:20 AM

## 2024-04-22 ENCOUNTER — HOSPITAL ENCOUNTER (OUTPATIENT)
Dept: ULTRASOUND IMAGING | Facility: HOSPITAL | Age: 34
Discharge: HOME | End: 2024-04-22
Admitting: INTERNAL MEDICINE
Payer: COMMERCIAL

## 2024-04-22 ENCOUNTER — OFFICE VISIT (OUTPATIENT)
Dept: INTERNAL MEDICINE | Facility: CLINIC | Age: 34
End: 2024-04-22
Payer: COMMERCIAL

## 2024-04-22 VITALS
WEIGHT: 227 LBS | OXYGEN SATURATION: 98 % | TEMPERATURE: 97.5 F | HEART RATE: 72 BPM | HEIGHT: 69 IN | SYSTOLIC BLOOD PRESSURE: 150 MMHG | DIASTOLIC BLOOD PRESSURE: 106 MMHG | BODY MASS INDEX: 33.62 KG/M2

## 2024-04-22 DIAGNOSIS — R11.2 NAUSEA AND VOMITING, UNSPECIFIED VOMITING TYPE: Primary | ICD-10-CM

## 2024-04-22 DIAGNOSIS — R68.89 SYMPTOM OF DRUG WITHDRAWAL: ICD-10-CM

## 2024-04-22 DIAGNOSIS — F43.21 GRIEF REACTION: ICD-10-CM

## 2024-04-22 DIAGNOSIS — Z76.89 ENCOUNTER TO ESTABLISH CARE WITH NEW DOCTOR: Primary | ICD-10-CM

## 2024-04-22 DIAGNOSIS — F41.1 GENERALIZED ANXIETY DISORDER: ICD-10-CM

## 2024-04-22 DIAGNOSIS — R11.2 NAUSEA AND VOMITING, UNSPECIFIED VOMITING TYPE: ICD-10-CM

## 2024-04-22 DIAGNOSIS — R10.84 GENERALIZED ABDOMINAL PAIN: ICD-10-CM

## 2024-04-22 DIAGNOSIS — R03.0 ELEVATED BLOOD PRESSURE READING IN OFFICE WITHOUT DIAGNOSIS OF HYPERTENSION: ICD-10-CM

## 2024-04-22 PROCEDURE — 76705 ECHO EXAM OF ABDOMEN: CPT

## 2024-04-22 PROCEDURE — 99214 OFFICE O/P EST MOD 30 MIN: CPT | Performed by: INTERNAL MEDICINE

## 2024-04-22 RX ORDER — ONDANSETRON 4 MG/1
4 TABLET, ORALLY DISINTEGRATING ORAL EVERY 6 HOURS PRN
Qty: 30 TABLET | Refills: 1 | Status: SHIPPED | OUTPATIENT
Start: 2024-04-22

## 2024-04-22 NOTE — PROGRESS NOTES
"    Chief Complaint  Establish Care, Stomach Ulcer (Pt states he was treated at  ED for stomach ulcer;/Pt was experiencing diarrhea, vomiting, abdominal pain, belching /04/05 [facility] ED), Anxiety (Pt sees Behavioral Health 04/24), and Referral to GI     Subjective        Kyle Buchanan presents to [facility]  Anxiety      See below.     Objective   Vital Signs:  BP (!) 150/106 (BP Location: Left arm, Patient Position: Sitting, Cuff Size: Adult)   Pulse 72   Temp 97.5 °F (36.4 °C) (Infrared)   Ht 175.3 cm (69\")   Wt 103 kg (227 lb)   SpO2 98%   BMI 33.52 kg/m²   Estimated body mass index is 33.52 kg/m² as calculated from the following:    Height as of this encounter: 175.3 cm (69\").    Weight as of this encounter: 103 kg (227 lb).     BMI is >= 30 and <35. (Class 1 Obesity). The following options were offered after discussion;: weight loss educational material (shared in after visit summary)    Physical Exam  Constitutional:       General: He is not in acute distress.     Appearance: He is obese. He is not ill-appearing.   HENT:      Head: Normocephalic and atraumatic.   Eyes:      Conjunctiva/sclera: Conjunctivae normal.      Pupils: Pupils are equal, round, and reactive to light.   Cardiovascular:      Rate and Rhythm: Normal rate and regular rhythm.      Heart sounds: Normal heart sounds.   Pulmonary:      Effort: Pulmonary effort is normal. No respiratory distress.      Breath sounds: Normal breath sounds.   Musculoskeletal:         General: No swelling.      Cervical back: Neck supple.   Skin:     General: Skin is warm and dry.      Findings: No rash.   Neurological:      General: No focal deficit present.      Mental Status: He is alert and oriented to person, place, and time.   Psychiatric:      Comments: Anxious at times.  Simplistic in conversation and narrative.  His sister does quite a bit of the talking as well.        Result Review :  ER workup from 4/10/24: "   CT abdomen/pelvis:   IMPRESSION:  1.  No acute findings in the abdomen or pelvis.  This report was signed and finalized on 4/10/2024 10:22 AM by Dr. Alden Williamson MD.    Thyroid normal.   Lipase normal.   Coags normal.   CBC unremarkable.   CMP normal except for glucose 111; non fasting.     Urine drug screen positive for THC, BZD.   Ethanol negative.          Assessment and Plan   Diagnoses and all orders for this visit:    1. Encounter to establish care with new doctor (Primary)    2. Generalized anxiety disorder    3. Grief reaction    4. Nausea and vomiting, unspecified vomiting type  -     US Gallbladder; Future  -     ondansetron ODT (ZOFRAN-ODT) 4 MG disintegrating tablet; Place 1 tablet on the tongue Every 6 (Six) Hours As Needed for Nausea.  Dispense: 30 tablet; Refill: 1    5. Generalized abdominal pain    6. Possible symptoms of Kratom withdrawal    7. Elevated blood pressure reading in office without diagnosis of hypertension       Presents today to establish care.  He is accompanied by his sister who is an RN at Dr. Kearns's office.  He recommended to her that the patient come and see me.    The patient lives with his parents.  Unfortunately, his father  at the start of the month.  He passed away from complications of bladder cancer.  This has been stressful to the patient and has made his anxiety worse.  He sees behavioral health at [facility] on Wednesday.  Hydroxyzine has been somewhat effective at helping with his anxiety.  This was recently prescribed by the ER.  He has been on Wellbutrin in the past.  He has required admission to psychiatry at [facility] previously.  I do not have any plans to prescribe any anxiety/psychiatric medications today given how close his psychiatric evaluation is on Wednesday.    He has been in the ER at [facility] earlier this month and then also [facility].  I was unable to review the workup at [facility].  Reportedly per his sister nothing really turned up.  I did review the workup  as above from [facility].  The main reason for going to the emergency department has been problems with generalized abdominal discomfort, nausea/vomiting especially after eating.  The patient does have a history of gastroesophageal reflux disease and has been taking omeprazole.  He has been worried about an ulcer.  However, his pain and nausea get worse after eating.  Explained to he and his sister that I would expect to get better if it was related to peptic ulcer disease.  He relates that when he eats healthier foods that he feels fine.  He states that last week he had a pretty large helping of [employer] [place] and this set off the worst episode of the issue that he has had.  CT did not show anything significant with regards to the gallbladder or biliary tree.  Lipase and hepatic function panel were normal.  Do feel that we should further investigate his gallbladder prior to referral to GI.  Will have him get an ultrasound and then consider a HIDA scan.    He does also admit that he has been using Kratom heavily in the recent past.  However, he states that he almost has some self completely weaned off of this.  Explained to he and his sister that some of his above problems could be directly related to getting off of this.  They understand that.  However, he states that his GI issues started when he was already down to basically not using it any longer.  I still have my doubts that this may very well be his primary issue.    He was noted to be hypertensive with a blood pressure of 150/106 today.  He feels that this is related to being very anxious about seeing me.  His blood pressure was 130/95 in the ER.  He does not wish to start medications. He was provided with a blood pressure log.  His sister is willing to take his blood pressure for him.  They were encouraged to call sooner if his blood pressure remains uncontrolled.    Plan to see him back in 4 weeks or sooner if issues.        Follow Up   Return in about 4  weeks (around 5/20/2024) for Annual physical.  Patient was given instructions and counseling regarding his condition or for health maintenance advice. Please see specific information pulled into the AVS if appropriate.      ADAMARIS Lara DO       Electronically signed by ELSY Lara DO, 04/22/24, 2:56 PM CDT.

## 2024-04-23 ENCOUNTER — TELEPHONE (OUTPATIENT)
Dept: PSYCHIATRY | Age: 34
End: 2024-04-23

## 2024-04-23 NOTE — TELEPHONE ENCOUNTER
Called and confirmed appt with pt for 4/24 @ 2:30 PM    Electronically signed by Fern Quijano on 4/23/2024 at 10:27 AM

## 2024-04-24 ENCOUNTER — OFFICE VISIT (OUTPATIENT)
Dept: PSYCHIATRY | Age: 34
End: 2024-04-24
Payer: COMMERCIAL

## 2024-04-24 ENCOUNTER — TELEPHONE (OUTPATIENT)
Dept: PSYCHIATRY | Age: 34
End: 2024-04-24

## 2024-04-24 VITALS
WEIGHT: 222 LBS | TEMPERATURE: 97.8 F | BODY MASS INDEX: 32.88 KG/M2 | DIASTOLIC BLOOD PRESSURE: 91 MMHG | OXYGEN SATURATION: 98 % | SYSTOLIC BLOOD PRESSURE: 145 MMHG | HEIGHT: 69 IN | HEART RATE: 82 BPM

## 2024-04-24 DIAGNOSIS — F32.A DEPRESSION, UNSPECIFIED DEPRESSION TYPE: Primary | ICD-10-CM

## 2024-04-24 DIAGNOSIS — G47.00 INSOMNIA, UNSPECIFIED TYPE: ICD-10-CM

## 2024-04-24 DIAGNOSIS — F41.1 GAD (GENERALIZED ANXIETY DISORDER): ICD-10-CM

## 2024-04-24 DIAGNOSIS — G47.10 HYPERSOMNIA: ICD-10-CM

## 2024-04-24 DIAGNOSIS — F43.21 GRIEF REACTION: ICD-10-CM

## 2024-04-24 DIAGNOSIS — F12.21 CANNABIS USE DISORDER, MODERATE, IN EARLY REMISSION (HCC): ICD-10-CM

## 2024-04-24 DIAGNOSIS — Z72.0 TOBACCO USE: ICD-10-CM

## 2024-04-24 DIAGNOSIS — F19.939: ICD-10-CM

## 2024-04-24 PROCEDURE — 90792 PSYCH DIAG EVAL W/MED SRVCS: CPT | Performed by: PSYCHIATRY & NEUROLOGY

## 2024-04-24 RX ORDER — TRAZODONE HYDROCHLORIDE 50 MG/1
50 TABLET ORAL NIGHTLY
Qty: 30 TABLET | Refills: 1 | Status: SHIPPED | OUTPATIENT
Start: 2024-04-24 | End: 2024-05-24

## 2024-04-24 RX ORDER — GABAPENTIN 300 MG/1
300 CAPSULE ORAL 3 TIMES DAILY
Qty: 90 CAPSULE | Refills: 1 | Status: SHIPPED | OUTPATIENT
Start: 2024-04-24 | End: 2024-05-24

## 2024-04-24 ASSESSMENT — PATIENT HEALTH QUESTIONNAIRE - PHQ9
1. LITTLE INTEREST OR PLEASURE IN DOING THINGS: MORE THAN HALF THE DAYS
SUM OF ALL RESPONSES TO PHQ QUESTIONS 1-9: 11
5. POOR APPETITE OR OVEREATING: SEVERAL DAYS
8. MOVING OR SPEAKING SO SLOWLY THAT OTHER PEOPLE COULD HAVE NOTICED. OR THE OPPOSITE, BEING SO FIGETY OR RESTLESS THAT YOU HAVE BEEN MOVING AROUND A LOT MORE THAN USUAL: MORE THAN HALF THE DAYS
9. THOUGHTS THAT YOU WOULD BE BETTER OFF DEAD, OR OF HURTING YOURSELF: NOT AT ALL
SUM OF ALL RESPONSES TO PHQ QUESTIONS 1-9: 11
SUM OF ALL RESPONSES TO PHQ QUESTIONS 1-9: 11
6. FEELING BAD ABOUT YOURSELF - OR THAT YOU ARE A FAILURE OR HAVE LET YOURSELF OR YOUR FAMILY DOWN: NOT AT ALL
3. TROUBLE FALLING OR STAYING ASLEEP: NEARLY EVERY DAY
SUM OF ALL RESPONSES TO PHQ9 QUESTIONS 1 & 2: 2
4. FEELING TIRED OR HAVING LITTLE ENERGY: SEVERAL DAYS
7. TROUBLE CONCENTRATING ON THINGS, SUCH AS READING THE NEWSPAPER OR WATCHING TELEVISION: MORE THAN HALF THE DAYS
SUM OF ALL RESPONSES TO PHQ QUESTIONS 1-9: 11
10. IF YOU CHECKED OFF ANY PROBLEMS, HOW DIFFICULT HAVE THESE PROBLEMS MADE IT FOR YOU TO DO YOUR WORK, TAKE CARE OF THINGS AT HOME, OR GET ALONG WITH OTHER PEOPLE: EXTREMELY DIFFICULT
2. FEELING DOWN, DEPRESSED OR HOPELESS: NOT AT ALL

## 2024-04-24 NOTE — TELEPHONE ENCOUNTER
Pt was in office when this appt was scheduled.  Scheduled with Karmen Valdez for 05/17/24 @ 11:00.    Electronically signed by Elena Guzman MA on 4/24/2024 at 3:35 PM

## 2024-04-24 NOTE — PROGRESS NOTES
PSYCHIATRIC EVALUATION    Date of Service:  4/24/2024    Chief Complaint   Patient presents with    Mental Health Problem       HISTORY OF PRESENT ILLNESS  33-year-old white male with history of depression, anxiety, presents to [facility] care, referred by RUDY Velazquez.  Previous records and labs reviewed.  Patient was admitted to [facility] in 2019.  He is on hydroxyzine - takes 50 mg at bedtime. Recently some GI issues. HTN today - discussed.     Pt is calm and cooperative.  Some depression. A lot of anxiety. Denies SI.  Father, 74, passed away due to bladder cancer at the beginning of this month. He has cut down on kratom use after father passed. He has been having anxiety attacks. HTN, sweating. He had to go to the ER.  Recent UDS positive for THC and BZD - states he took dad's old supply of Ativan. Pt has a 11 yo daughter - her mom is not in the picture. Pt is not sleeping.  Tired in the morning and during the day. Poor concentration. Hypersomnia during the day. Mom has [facility].    PSYCHIATRIC HISTORY  Diagnoses: Depression, anxiety  Suicide attempts/gestures: Denies   Prior hospitalizations: [facility] - 2019   Medication trials: Wellbutrin, Celexa, Imipramine, melatonin  Mental health contact: Lost to follow-up   Head trauma: Denies     SUBSTANCE USE HISTORY  Denies alcohol use.  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  Quit cannabis 2 days ago.  He chews tobacco.     FAMILY PSYCHIATRIC HISTORY  Mom - depression - on Cymbalta. Sister - depression. No suicide attempts.      Social History  Marital status - single  Children - 11 yo daughter   Trauma and/or Abuse - daughter's mom is a drug user   Legal - denies   Work History - [employer]  Education - HS   status - none      BP (!) 145/91   Pulse 82   Temp 97.8 °F (36.6 °C)   Ht 1.753 m (5' 9\")   Wt 100.7 kg (222 lb)   SpO2 98%   BMI 32.78 kg/m²       negative history of seizures.    negative history of head

## 2024-05-02 ENCOUNTER — TELEPHONE (OUTPATIENT)
Dept: PSYCHIATRY | Age: 34
End: 2024-05-02

## 2024-05-02 NOTE — TELEPHONE ENCOUNTER
Pt returned call to provide update on how he is doing since his first appt with Dr Johnson last week.  Pt stated that he was doing better.  \"I feel great today\".  Pt stated that he could not take the Gabapentin due to it causing his GI issues to be worse.  \"I been having these attacks when I eat the wrong thing\" before taking Gabapentin and now it is worse which makes my anxiety worse. He said the med caused \"bad heartburn, vision issues, hands sweaty and feel like going to have diarrhea but don't\".  He stated that his B/P was 150/90 the other day so he took a Gabapentin at that time.  Pt stated he would monitor his B/P.  He will call back here if B/P goes back up and call his medical doctor. He reported he last took Gabapentin 2 days ago.  He stated he has not taken any Kratem since he saw Dr Johnson. He stated that he is seeing a GI specialist at [facility].  \"They think I may has an ulcer\".  Pt stated that he has a HIDA scan scheduled.  He said he did not want any med in place of the Gabapentin.  He was encouraged to call back as needed between appts with any questions or concerns.

## 2024-05-02 NOTE — TELEPHONE ENCOUNTER
Attempted to contact pt as requested by Dr Johnson to see how he is doing since his first appt last week-left VM with the above info and request for call back.

## 2024-05-15 ENCOUNTER — TELEPHONE (OUTPATIENT)
Dept: PSYCHIATRY | Age: 34
End: 2024-05-15

## 2024-05-15 NOTE — TELEPHONE ENCOUNTER
Called pt on 05/15/24 for appointment reminder for 05/17/24.     -left voicemail, requesting a return call     Reminded patient to complete their visit pre-check/digital registration in [employer].

## 2024-05-20 ENCOUNTER — OFFICE VISIT (OUTPATIENT)
Dept: INTERNAL MEDICINE | Facility: CLINIC | Age: 34
End: 2024-05-20
Payer: COMMERCIAL

## 2024-05-20 VITALS
HEIGHT: 69 IN | TEMPERATURE: 97.8 F | WEIGHT: 222.2 LBS | SYSTOLIC BLOOD PRESSURE: 118 MMHG | BODY MASS INDEX: 32.91 KG/M2 | OXYGEN SATURATION: 98 % | HEART RATE: 71 BPM | DIASTOLIC BLOOD PRESSURE: 70 MMHG

## 2024-05-20 DIAGNOSIS — R10.84 GENERALIZED ABDOMINAL PAIN: ICD-10-CM

## 2024-05-20 DIAGNOSIS — Z00.00 WELLNESS EXAMINATION: ICD-10-CM

## 2024-05-20 DIAGNOSIS — R11.2 NAUSEA AND VOMITING, UNSPECIFIED VOMITING TYPE: ICD-10-CM

## 2024-05-20 DIAGNOSIS — Z11.59 ENCOUNTER FOR HEPATITIS C SCREENING TEST FOR LOW RISK PATIENT: ICD-10-CM

## 2024-05-20 DIAGNOSIS — Z00.00 ANNUAL PHYSICAL EXAM: Primary | ICD-10-CM

## 2024-05-20 DIAGNOSIS — R03.0 ELEVATED BLOOD PRESSURE READING IN OFFICE WITHOUT DIAGNOSIS OF HYPERTENSION: ICD-10-CM

## 2024-05-20 DIAGNOSIS — R68.89 SYMPTOM OF DRUG WITHDRAWAL: ICD-10-CM

## 2024-05-20 DIAGNOSIS — F43.21 GRIEF REACTION: ICD-10-CM

## 2024-05-20 PROCEDURE — 99395 PREV VISIT EST AGE 18-39: CPT | Performed by: INTERNAL MEDICINE

## 2024-05-20 NOTE — PROGRESS NOTES
"    Chief Complaint  Annual Exam (HIDA scan 6/5/24)    Subjective        Kyle Buchanan presents to [facility]  History of Present Illness  See below.     Objective   Vital Signs:  /70 (BP Location: Left arm, Patient Position: Sitting, Cuff Size: Adult)   Pulse 71   Temp 97.8 °F (36.6 °C) (Temporal)   Ht 175.3 cm (69.02\")   Wt 101 kg (222 lb 3.2 oz)   SpO2 98%   BMI 32.80 kg/m²   Estimated body mass index is 32.8 kg/m² as calculated from the following:    Height as of this encounter: 175.3 cm (69.02\").    Weight as of this encounter: 101 kg (222 lb 3.2 oz).         Physical Exam  Constitutional:       Appearance: He is obese.   HENT:      Head: Normocephalic and atraumatic.      Right Ear: Tympanic membrane and ear canal normal.      Left Ear: Tympanic membrane and ear canal normal.   Eyes:      Conjunctiva/sclera: Conjunctivae normal.      Pupils: Pupils are equal, round, and reactive to light.   Cardiovascular:      Rate and Rhythm: Normal rate and regular rhythm.      Heart sounds: Normal heart sounds.   Pulmonary:      Effort: Pulmonary effort is normal. No respiratory distress.      Breath sounds: Normal breath sounds.   Abdominal:      General: Bowel sounds are normal. There is no distension.      Palpations: Abdomen is soft.      Tenderness: There is no abdominal tenderness.   Musculoskeletal:         General: No swelling.      Cervical back: Neck supple.   Skin:     General: Skin is warm and dry.      Findings: No rash.   Neurological:      General: No focal deficit present.      Mental Status: He is alert and oriented to person, place, and time.   Psychiatric:         Mood and Affect: Mood normal.         Behavior: Behavior normal.         Thought Content: Thought content normal.         Judgment: Judgment normal.        Result Review :  Ultrasound of the gallbladder from 4/22/2024 showed no gallbladder abnormality seen.    ER workup from 4/10/24:   CT " abdomen/pelvis:   IMPRESSION:  1.  No acute findings in the abdomen or pelvis.  This report was signed and finalized on 4/10/2024 10:22 AM by Dr. Alden Williamson MD.     Thyroid normal.   Lipase normal.   Coags normal.   CBC unremarkable.   CMP normal except for glucose 111; non fasting.      Urine drug screen positive for THC, BZD.   Ethanol negative.          Assessment and Plan   Diagnoses and all orders for this visit:    1. Annual physical exam (Primary)    2. Wellness examination  -     Lipid Panel    3. Nausea and vomiting, unspecified vomiting type    4. Generalized abdominal pain    5. Grief reaction    6. Elevated blood pressure reading in office without diagnosis of hypertension    7. Possible symptoms of Kratom withdrawal    8. Encounter for hepatitis C screening test for low risk patient  -     Hepatitis C antibody    Presents today for follow-up and annual physical.    He states that the problems with nausea, vomiting, and abdominal pain since his initial visit have basically completely subsided.  We did get a gallbladder ultrasound in late April that was normal.  He is scheduled to undergo a HIDA scan in early June.  I am going to actually call him few days before this is scheduled and if he has had no return of symptoms, then we plan to cancel it.      His blood pressure is 118/70 today.  On his last visit it was 130/95.  Continue to monitor.    After I saw him, he saw [facility] shortly thereafter.  He states that they wanted to put him on gabapentin and trazodone, but he decided not to.  He feels that his anxiety was completely related to the recent passing of his father.  He states that he feels like he is doing well at this point and does not wish to be on medication.    I feel that some of his recent issues were related to kratom withdrawal.  We discussed that on his last visit.  States that he has been completely off of it since April 24.    Still occasionally vapes and we discussed  completely ceasing that.     Counseled on appropriate dental and vision care.  He does not have any eye problems and has not seen an optometrist.  He had a tooth extracted several months ago, but does not keep regular follow-up with dentistry.    He had most labs back in April.  He does need a lipid panel and will also screen for hepatitis C.  He is technically nonfasting, but had a very light salad for lunch around 11 AM.    Plan to see him back in 6 months.  If he is doing very well at that time, then we can likely only see him on a yearly basis.         Follow Up   Return in about 6 months (around 11/20/2024) for Recheck.  Patient was given instructions and counseling regarding his condition or for health maintenance advice. Please see specific information pulled into the AVS if appropriate.      ADAMARIS Lara DO       Electronically signed by ELSY Lara DO, 05/20/24, 3:39 PM CDT.

## 2024-05-21 ENCOUNTER — TELEPHONE (OUTPATIENT)
Dept: PSYCHIATRY | Age: 34
End: 2024-05-21

## 2024-05-21 LAB
CHOLEST SERPL-MCNC: 175 MG/DL (ref 100–199)
HCV IGG SERPL QL IA: NON REACTIVE
HDLC SERPL-MCNC: 47 MG/DL
LDLC SERPL CALC-MCNC: 82 MG/DL (ref 0–99)
TRIGL SERPL-MCNC: 279 MG/DL (ref 0–149)
VLDLC SERPL CALC-MCNC: 46 MG/DL (ref 5–40)

## 2024-05-21 NOTE — TELEPHONE ENCOUNTER
Called pt on 05/21/24 for appointment reminder for 05/23/24.   ?   -left voicemail, requesting a return call   ?   Reminded patient to complete their visit pre-check/digital registration in [employer].

## 2024-05-23 ENCOUNTER — TELEPHONE (OUTPATIENT)
Dept: PSYCHIATRY | Age: 34
End: 2024-05-23

## 2024-05-23 NOTE — TELEPHONE ENCOUNTER
Patient no showed to their appointment in [facility]. Office called the patient to check on them and to reschedule their appointment.     Left voicemail for patient to call the office back at [phone] Option 3 and provided education regarding the three no show and dismissal policies. Provided education that after three no show, patients can be dismissed by the provider and practice.  .     Barriers to treatment: N/A - didn't speak to patient.      Electronically signed by Fern Quijano on 5/23/2024 at 2:29 PM

## 2024-06-03 ENCOUNTER — TELEPHONE (OUTPATIENT)
Dept: INTERNAL MEDICINE | Facility: CLINIC | Age: 34
End: 2024-06-03
Payer: COMMERCIAL

## 2024-06-03 NOTE — TELEPHONE ENCOUNTER
Touched base with him about canceling the HIDA scan.  He is no longer having any abdominal discomfort, nausea/vomiting.  He reported to me on his recent visit that this had resolved.  We wanted to not cancel the appointment immediately in case issues returned.  I will have my medical assistant cancel this for him tomorrow morning.    Electronically signed by ELSY Lara DO, 06/03/24, 4:31 PM CDT.

## 2024-06-19 ENCOUNTER — OFFICE VISIT (OUTPATIENT)
Dept: INTERNAL MEDICINE | Facility: CLINIC | Age: 34
End: 2024-06-19
Payer: COMMERCIAL

## 2024-06-19 VITALS
OXYGEN SATURATION: 98 % | BODY MASS INDEX: 32.29 KG/M2 | HEART RATE: 72 BPM | DIASTOLIC BLOOD PRESSURE: 88 MMHG | TEMPERATURE: 95.9 F | HEIGHT: 69 IN | WEIGHT: 218 LBS | SYSTOLIC BLOOD PRESSURE: 138 MMHG

## 2024-06-19 DIAGNOSIS — J02.8 PHARYNGITIS DUE TO OTHER ORGANISM: Primary | ICD-10-CM

## 2024-06-19 DIAGNOSIS — L23.7 POISON IVY DERMATITIS: ICD-10-CM

## 2024-06-19 PROCEDURE — 99213 OFFICE O/P EST LOW 20 MIN: CPT | Performed by: INTERNAL MEDICINE

## 2024-06-19 PROCEDURE — 87880 STREP A ASSAY W/OPTIC: CPT | Performed by: INTERNAL MEDICINE

## 2024-06-19 RX ORDER — TRIAMCINOLONE ACETONIDE 1 MG/G
1 OINTMENT TOPICAL 2 TIMES DAILY
Qty: 15 G | Refills: 0 | Status: SHIPPED | OUTPATIENT
Start: 2024-06-19

## 2024-06-19 RX ORDER — AMOXICILLIN 500 MG/1
500 CAPSULE ORAL 2 TIMES DAILY
Qty: 20 CAPSULE | Refills: 0 | Status: SHIPPED | OUTPATIENT
Start: 2024-06-19

## 2024-06-20 LAB
EXPIRATION DATE: ABNORMAL
INTERNAL CONTROL: ABNORMAL
Lab: ABNORMAL
S PYO AG THROAT QL: NEGATIVE

## 2024-11-21 ENCOUNTER — TELEPHONE (OUTPATIENT)
Dept: INTERNAL MEDICINE | Facility: CLINIC | Age: 34
End: 2024-11-21
Payer: COMMERCIAL

## 2024-11-21 NOTE — TELEPHONE ENCOUNTER
Called patient regarding No Show policy for missed appointment 11/20/2024. Recording said number has been changed, disconnected or no longer in service.

## 2024-11-22 ENCOUNTER — TELEPHONE (OUTPATIENT)
Dept: PSYCHIATRY | Age: 34
End: 2024-11-22

## 2024-11-22 NOTE — TELEPHONE ENCOUNTER
[facility].  Pt cancelled the appt and stated that he did not want to reschedule.    Notified provider.    Electronically signed by Elena Guzman MA on 11/22/2024 at 3:19 PM

## 2025-01-08 ENCOUNTER — APPOINTMENT (OUTPATIENT)
Dept: GENERAL RADIOLOGY | Facility: HOSPITAL | Age: 35
End: 2025-01-08
Payer: COMMERCIAL

## 2025-01-08 PROCEDURE — 74018 RADEX ABDOMEN 1 VIEW: CPT

## 2025-01-08 PROCEDURE — 71046 X-RAY EXAM CHEST 2 VIEWS: CPT

## 2025-01-30 ENCOUNTER — OFFICE VISIT (OUTPATIENT)
Dept: INTERNAL MEDICINE | Facility: CLINIC | Age: 35
End: 2025-01-30
Payer: COMMERCIAL

## 2025-01-30 VITALS
BODY MASS INDEX: 36.73 KG/M2 | OXYGEN SATURATION: 98 % | SYSTOLIC BLOOD PRESSURE: 140 MMHG | HEART RATE: 91 BPM | DIASTOLIC BLOOD PRESSURE: 86 MMHG | HEIGHT: 69 IN | TEMPERATURE: 96.5 F | WEIGHT: 248 LBS

## 2025-01-30 DIAGNOSIS — K30 GASTROINTESTINAL DISTRESS: ICD-10-CM

## 2025-01-30 DIAGNOSIS — F41.1 GENERALIZED ANXIETY DISORDER: Primary | ICD-10-CM

## 2025-01-30 DIAGNOSIS — I10 ESSENTIAL HYPERTENSION: ICD-10-CM

## 2025-01-30 PROCEDURE — 99214 OFFICE O/P EST MOD 30 MIN: CPT | Performed by: INTERNAL MEDICINE

## 2025-01-30 RX ORDER — FAMOTIDINE 20 MG/1
20 TABLET, FILM COATED ORAL 2 TIMES DAILY PRN
Start: 2025-01-30

## 2025-01-30 RX ORDER — BUSPIRONE HYDROCHLORIDE 5 MG/1
5 TABLET ORAL 3 TIMES DAILY PRN
Qty: 90 TABLET | Refills: 1 | Status: SHIPPED | OUTPATIENT
Start: 2025-01-30

## 2025-01-30 RX ORDER — PROPRANOLOL HYDROCHLORIDE 10 MG/1
10 TABLET ORAL 2 TIMES DAILY
Qty: 60 TABLET | Refills: 1 | Status: SHIPPED | OUTPATIENT
Start: 2025-01-30

## 2025-01-30 RX ORDER — PAROXETINE 10 MG/1
10 TABLET, FILM COATED ORAL EVERY MORNING
Qty: 30 TABLET | Refills: 1 | Status: SHIPPED | OUTPATIENT
Start: 2025-01-30

## 2025-01-30 NOTE — PROGRESS NOTES
"    Chief Complaint  Anxiety (Increased anxiety when he leaves the house. Hands become sweaty. Recently stopped vaping THC. ) and GI Problem (Patient states he gets gassy when he has increased anxiety.  Denies abdominal pain. Taking OTC anti-gas medication. )    Subjective        Kyle Buchanan presents to [facility]  Anxiety    GI Problem      See below.     Objective   Vital Signs:  /86 (BP Location: Left arm, Patient Position: Sitting, Cuff Size: Adult)   Pulse 91   Temp 96.5 °F (35.8 °C) (Temporal)   Ht 175.3 cm (69\")   Wt 112 kg (248 lb)   SpO2 98%   BMI 36.62 kg/m²   Estimated body mass index is 36.62 kg/m² as calculated from the following:    Height as of this encounter: 175.3 cm (69\").    Weight as of this encounter: 112 kg (248 lb).         Physical Exam  HENT:      Head: Normocephalic and atraumatic.   Eyes:      Conjunctiva/sclera: Conjunctivae normal.      Pupils: Pupils are equal, round, and reactive to light.   Cardiovascular:      Rate and Rhythm: Normal rate and regular rhythm.      Heart sounds: Normal heart sounds.   Pulmonary:      Effort: Pulmonary effort is normal. No respiratory distress.      Breath sounds: Normal breath sounds.   Musculoskeletal:         General: No swelling.   Skin:     General: Skin is warm and dry.      Findings: No rash.   Neurological:      General: No focal deficit present.      Mental Status: He is alert and oriented to person, place, and time.   Psychiatric:      Comments: Not anxious at present.  Flat affect, but conversational.        Result Review :  Nothing new to review.         Assessment and Plan   Diagnoses and all orders for this visit:    1. Generalized anxiety disorder (Primary)  -     PARoxetine (Paxil) 10 MG tablet; Take 1 tablet by mouth Every Morning.  Dispense: 30 tablet; Refill: 1  -     busPIRone (BUSPAR) 5 MG tablet; Take 1 tablet by mouth 3 (Three) Times a Day As Needed (anxiety).  Dispense: 90 " tablet; Refill: 1  -     propranolol (INDERAL) 10 MG tablet; Take 1 tablet by mouth 2 (Two) Times a Day.  Dispense: 60 tablet; Refill: 1    2. Essential hypertension  -     propranolol (INDERAL) 10 MG tablet; Take 1 tablet by mouth 2 (Two) Times a Day.  Dispense: 60 tablet; Refill: 1    3. Gastrointestinal distress  -     famotidine (Pepcid) 20 MG tablet; Take 1 tablet by mouth 2 (Two) Times a Day As Needed for Heartburn. He obtains OTC.       Presents today for problem visit.    He missed his previous, regularly scheduled visit.    He is having more difficulty with anxiety.  This has become a more recurrent issue.  He was using kratom last year and has not done that since April 2024.  He states that he also stopped vaping THC on 1/4.  He may have some degree of agoraphobia based on his description of the issue.  He is wishing to treat this with prescription medication at this point.  We will place him on 10 mg of Paxil.  He will have 5 mg of BuSpar to use on an as-needed basis.  He additionally has had elevated blood pressure recently at urgent care as well as here today.  Feel that propranolol could help this issue and also potentially the anxiety problem.    I would hope that his GI complaints get better with the treatment of his anxiety.    I want him to come back in 6 weeks for reevaluation.      Follow Up   Return in about 6 weeks (around 3/13/2025) for Recheck.  Patient was given instructions and counseling regarding his condition or for health maintenance advice. Please see specific information pulled into the AVS if appropriate.      ADAMARIS Lara DO       Electronically signed by ELSY Lara DO, 01/30/25, 8:45 AM CST.

## 2025-03-13 ENCOUNTER — OFFICE VISIT (OUTPATIENT)
Dept: INTERNAL MEDICINE | Facility: CLINIC | Age: 35
End: 2025-03-13
Payer: COMMERCIAL

## 2025-03-13 VITALS
HEIGHT: 69 IN | BODY MASS INDEX: 37.77 KG/M2 | WEIGHT: 255 LBS | HEART RATE: 85 BPM | TEMPERATURE: 96.5 F | OXYGEN SATURATION: 99 % | SYSTOLIC BLOOD PRESSURE: 150 MMHG | DIASTOLIC BLOOD PRESSURE: 98 MMHG

## 2025-03-13 DIAGNOSIS — F41.1 GENERALIZED ANXIETY DISORDER: Primary | ICD-10-CM

## 2025-03-13 DIAGNOSIS — I10 ESSENTIAL HYPERTENSION: ICD-10-CM

## 2025-03-13 DIAGNOSIS — J31.0 RHINITIS MEDICAMENTOSA: ICD-10-CM

## 2025-03-13 DIAGNOSIS — T48.5X5A RHINITIS MEDICAMENTOSA: ICD-10-CM

## 2025-03-13 DIAGNOSIS — J30.2 SEASONAL ALLERGIES: ICD-10-CM

## 2025-03-13 RX ORDER — PROPRANOLOL HYDROCHLORIDE 10 MG/1
10 TABLET ORAL 2 TIMES DAILY
Qty: 60 TABLET | Refills: 1 | Status: SHIPPED | OUTPATIENT
Start: 2025-03-13

## 2025-03-13 RX ORDER — FLUTICASONE PROPIONATE 50 MCG
2 SPRAY, SUSPENSION (ML) NASAL DAILY
Qty: 16 G | Refills: 1 | Status: SHIPPED | OUTPATIENT
Start: 2025-03-13

## 2025-03-13 RX ORDER — BUSPIRONE HYDROCHLORIDE 5 MG/1
5 TABLET ORAL 3 TIMES DAILY PRN
Qty: 90 TABLET | Refills: 5 | Status: SHIPPED | OUTPATIENT
Start: 2025-03-13

## 2025-03-13 RX ORDER — LEVOCETIRIZINE DIHYDROCHLORIDE 5 MG/1
5 TABLET, FILM COATED ORAL EVERY EVENING
Qty: 90 TABLET | Refills: 1 | Status: SHIPPED | OUTPATIENT
Start: 2025-03-13

## 2025-03-13 NOTE — PROGRESS NOTES
"    Chief Complaint  Follow-up (6 week follow up/), Anxiety (Patient did not start Paxil due to the possible side effects.  He has been taking the Buspar and states his anxiety has improved. ), and Hypertension (Pranporanolol caused nausea.)    Subjective        Kyle Buchanan presents to [facility]  History of Present Illness  See below.     Objective   Vital Signs:  /98   Pulse 85   Temp 96.5 °F (35.8 °C) (Temporal)   Ht 175.3 cm (69\")   Wt 116 kg (255 lb)   SpO2 99%   BMI 37.66 kg/m²   Estimated body mass index is 37.66 kg/m² as calculated from the following:    Height as of this encounter: 175.3 cm (69\").    Weight as of this encounter: 116 kg (255 lb).         Physical Exam  HENT:      Head: Normocephalic and atraumatic.   Eyes:      Conjunctiva/sclera: Conjunctivae normal.      Pupils: Pupils are equal, round, and reactive to light.   Cardiovascular:      Rate and Rhythm: Normal rate and regular rhythm.      Heart sounds: Normal heart sounds.   Pulmonary:      Effort: Pulmonary effort is normal. No respiratory distress.      Breath sounds: Normal breath sounds.   Musculoskeletal:         General: No swelling.   Skin:     General: Skin is warm and dry.      Findings: No rash.   Neurological:      General: No focal deficit present.      Mental Status: He is alert and oriented to person, place, and time.   Psychiatric:         Mood and Affect: Mood normal.         Behavior: Behavior normal.         Thought Content: Thought content normal.         Judgment: Judgment normal.        Result Review :  Nothing new to review.  His last full labs were in May 2024.  He will be due for repeat fasting labs at his next appointment.         Assessment and Plan   Diagnoses and all orders for this visit:    1. Generalized anxiety disorder (Primary)  -     busPIRone (BUSPAR) 5 MG tablet; Take 1 tablet by mouth 3 (Three) Times a Day As Needed (anxiety).  Dispense: 90 tablet; " Refill: 5  -     propranolol (INDERAL) 10 MG tablet; Take 1 tablet by mouth 2 (Two) Times a Day.  Dispense: 60 tablet; Refill: 1    2. Essential hypertension  -     propranolol (INDERAL) 10 MG tablet; Take 1 tablet by mouth 2 (Two) Times a Day.  Dispense: 60 tablet; Refill: 1    3. Rhinitis medicamentosa    4. Seasonal allergies  -     fluticasone (FLONASE) 50 MCG/ACT nasal spray; Administer 2 sprays into the nostril(s) as directed by provider Daily.  Dispense: 16 g; Refill: 1  -     levocetirizine (XYZAL) 5 MG tablet; Take 1 tablet by mouth Every Evening.  Dispense: 90 tablet; Refill: 1       Presents today for short course follow-up after being seen for anxiety recently.    He only took 1 dose of propranolol because he felt like it made him nauseous.  I have asked him to give this another on honest try.  I feel that it could help with his anxiety as well as his hypertension.  He states that he will try and stick to it.  He never started the Paxil because he read side effects.  He does find the BuSpar effective at controlling his episodes so he does not want to consider a daily medication for now.    He states that he has had problems with being on too much Afrin for 2 longer recently.  He has started trying to only use 1 spray instead of 2.  However, he admits that he has been taking this 3-4 times per day rather than 2 times per day.  Recommend that he spread out the frequency and then also use distilled water to dilute the preparation.  He is using this in response to difficulty with sinus congestion from seasonal allergies.  He does use Xyzal over-the-counter.  We will see if his insurance will pay for this.  Will also add Flonase.    He will have his annual physical in June.  If he is doing well at that point in time, then we can start seeing him every 6 months.      Follow Up   Return in about 3 months (around 6/13/2025) for Annual physical.  Patient was given instructions and counseling regarding his  condition or for health maintenance advice. Please see specific information pulled into the AVS if appropriate.      ADAMARIS Lara DO       Electronically signed by ELSY Lara DO, 03/13/25, 8:54 AM CDT.

## 2025-06-12 ENCOUNTER — OFFICE VISIT (OUTPATIENT)
Dept: INTERNAL MEDICINE | Facility: CLINIC | Age: 35
End: 2025-06-12
Payer: COMMERCIAL

## 2025-06-12 VITALS
DIASTOLIC BLOOD PRESSURE: 84 MMHG | WEIGHT: 261 LBS | BODY MASS INDEX: 38.66 KG/M2 | OXYGEN SATURATION: 97 % | SYSTOLIC BLOOD PRESSURE: 148 MMHG | TEMPERATURE: 97.1 F | HEIGHT: 69 IN | HEART RATE: 82 BPM

## 2025-06-12 DIAGNOSIS — F41.1 GENERALIZED ANXIETY DISORDER: Primary | ICD-10-CM

## 2025-06-12 DIAGNOSIS — K21.9 GASTROESOPHAGEAL REFLUX DISEASE WITHOUT ESOPHAGITIS: ICD-10-CM

## 2025-06-12 DIAGNOSIS — I10 ESSENTIAL HYPERTENSION: ICD-10-CM

## 2025-06-12 DIAGNOSIS — J30.2 SEASONAL ALLERGIES: ICD-10-CM

## 2025-06-12 PROCEDURE — 99214 OFFICE O/P EST MOD 30 MIN: CPT | Performed by: INTERNAL MEDICINE

## 2025-06-12 RX ORDER — PANTOPRAZOLE SODIUM 40 MG/1
40 TABLET, DELAYED RELEASE ORAL DAILY
Qty: 90 TABLET | Refills: 1 | Status: SHIPPED | OUTPATIENT
Start: 2025-06-12

## 2025-06-12 NOTE — PROGRESS NOTES
"    Chief Complaint  Follow-up (3 month follow up for anxiety.  ) and Heartburn (Patient complaining of reflux.  Feels like famotidine is not helping. )    Subjective        Kyle Buchanan presents to [facility]  Heartburn  He complains of heartburn.     See below.     Objective   Vital Signs:  /84   Pulse 82   Temp 97.1 °F (36.2 °C) (Temporal)   Ht 175.3 cm (69\")   Wt 118 kg (261 lb)   SpO2 97%   BMI 38.54 kg/m²   Estimated body mass index is 38.54 kg/m² as calculated from the following:    Height as of this encounter: 175.3 cm (69\").    Weight as of this encounter: 118 kg (261 lb).       Physical Exam  HENT:      Head: Normocephalic and atraumatic.      Right Ear: Tympanic membrane and ear canal normal.      Left Ear: Tympanic membrane and ear canal normal.      Mouth/Throat:      Mouth: Mucous membranes are moist.      Pharynx: Oropharynx is clear.   Eyes:      Conjunctiva/sclera: Conjunctivae normal.      Pupils: Pupils are equal, round, and reactive to light.   Cardiovascular:      Rate and Rhythm: Normal rate and regular rhythm.      Heart sounds: Normal heart sounds.   Pulmonary:      Effort: Pulmonary effort is normal. No respiratory distress.      Breath sounds: Normal breath sounds.   Musculoskeletal:         General: No swelling.   Skin:     General: Skin is warm and dry.      Findings: No rash.   Neurological:      General: No focal deficit present.      Mental Status: He is alert and oriented to person, place, and time.   Psychiatric:      Comments: Flat affect, but conversational.        Result Review :  I went back through all of his old labs and he is out of date for nearly everything.  He is not fasting today.         Assessment and Plan   Diagnoses and all orders for this visit:    1. Generalized anxiety disorder (Primary)    2. Essential hypertension    3. Gastroesophageal reflux disease without esophagitis  -     pantoprazole (PROTONIX) 40 MG EC " tablet; Take 1 tablet by mouth Daily.  Dispense: 90 tablet; Refill: 1    4. Seasonal allergies       Presents today for follow-up.    He states that his anxiety is under much better control.  He did not tolerate Paxil previously.  He is taking BuSpar at least twice daily and often not a third time.  He takes in the morning before work and then around 2-3 p.m.    He admits that he is not always compliant with his propranolol.  His blood pressure is elevated today.  I would like for him to make sure that he is taking this on a regular basis because this should assist somewhat in treating that and then also help his anxiety.  If he continues to have elevated blood pressure despite taking the propranolol, then we may need to add an additional agent.    He complains of uncontrolled reflux symptoms.  He takes Pepcid on a as needed basis and has frequently using Tums.  I am going to put him on Protonix.  I need for him to at least faithfully take this every day for 2 weeks.  I have set a reminder to contact him about its effectiveness.  If he continues to have significant problems despite Protonix, then he may need to be referred to GI for endoscopy.    I encouraged him to be compliant with his Flonase and Xyzal.  He still complains of sinus congestion at times.    He will be due for his annual physical exam when he returns in December.  He is out of date for labs.  He states that he had a Monster coffee drink this morning and also ate a arciniega/egg/cheese biscuit.  His next appointment is at 8 AM.  I reminded him to please come fasting.      Follow Up   Return in about 6 months (around 12/12/2025) for Annual physical.  Patient was given instructions and counseling regarding his condition or for health maintenance advice. Please see specific information pulled into the AVS if appropriate.      ADAMARIS Lara DO       Electronically signed by ELSY Lara DO, 06/12/25, 9:03 AM CDT.

## 2025-06-16 DIAGNOSIS — J30.2 SEASONAL ALLERGIES: ICD-10-CM

## 2025-06-16 RX ORDER — LEVOCETIRIZINE DIHYDROCHLORIDE 5 MG/1
5 TABLET, FILM COATED ORAL EVERY EVENING
Qty: 90 TABLET | Refills: 1 | Status: SHIPPED | OUTPATIENT
Start: 2025-06-16